# Patient Record
Sex: MALE | Race: WHITE | Employment: OTHER | URBAN - METROPOLITAN AREA
[De-identification: names, ages, dates, MRNs, and addresses within clinical notes are randomized per-mention and may not be internally consistent; named-entity substitution may affect disease eponyms.]

---

## 2018-02-11 ENCOUNTER — HOSPITAL ENCOUNTER (INPATIENT)
Facility: CLINIC | Age: 83
LOS: 7 days | Discharge: NURSING, REHAB OR LONG-TERM CARE | DRG: 871 | End: 2018-02-18
Attending: EMERGENCY MEDICINE | Admitting: INTERNAL MEDICINE
Payer: MEDICARE

## 2018-02-11 ENCOUNTER — APPOINTMENT (OUTPATIENT)
Dept: GENERAL RADIOLOGY | Facility: CLINIC | Age: 83
DRG: 871 | End: 2018-02-11
Attending: EMERGENCY MEDICINE
Payer: MEDICARE

## 2018-02-11 ENCOUNTER — APPOINTMENT (OUTPATIENT)
Dept: SPEECH THERAPY | Facility: CLINIC | Age: 83
DRG: 871 | End: 2018-02-11
Attending: INTERNAL MEDICINE
Payer: MEDICARE

## 2018-02-11 ENCOUNTER — TRANSFERRED RECORDS (OUTPATIENT)
Dept: HEALTH INFORMATION MANAGEMENT | Facility: CLINIC | Age: 83
End: 2018-02-11

## 2018-02-11 DIAGNOSIS — R09.02 HYPOXIA: ICD-10-CM

## 2018-02-11 DIAGNOSIS — E87.6 HYPOKALEMIA: Primary | ICD-10-CM

## 2018-02-11 DIAGNOSIS — J18.9 COMMUNITY ACQUIRED PNEUMONIA OF LEFT LUNG, UNSPECIFIED PART OF LUNG: ICD-10-CM

## 2018-02-11 PROBLEM — A41.9 SEPSIS (H): Status: ACTIVE | Noted: 2018-02-11

## 2018-02-11 LAB
ALBUMIN SERPL-MCNC: 2.5 G/DL (ref 3.4–5)
ALBUMIN UR-MCNC: NEGATIVE MG/DL
ALP SERPL-CCNC: 58 U/L (ref 40–150)
ALT SERPL W P-5'-P-CCNC: 21 U/L (ref 0–70)
AMORPH CRY #/AREA URNS HPF: ABNORMAL /HPF
ANION GAP SERPL CALCULATED.3IONS-SCNC: 2 MMOL/L (ref 3–14)
APPEARANCE UR: ABNORMAL
AST SERPL W P-5'-P-CCNC: 23 U/L (ref 0–45)
BASOPHILS # BLD AUTO: 0 10E9/L (ref 0–0.2)
BASOPHILS NFR BLD AUTO: 0.2 %
BILIRUB SERPL-MCNC: 1.2 MG/DL (ref 0.2–1.3)
BILIRUB UR QL STRIP: NEGATIVE
BUN SERPL-MCNC: 17 MG/DL (ref 7–30)
CALCIUM SERPL-MCNC: 8.5 MG/DL (ref 8.5–10.1)
CHLORIDE SERPL-SCNC: 109 MMOL/L (ref 94–109)
CO2 BLDCOV-SCNC: 30 MMOL/L (ref 21–28)
CO2 SERPL-SCNC: 31 MMOL/L (ref 20–32)
COLOR UR AUTO: YELLOW
CREAT SERPL-MCNC: 0.59 MG/DL (ref 0.66–1.25)
DIFFERENTIAL METHOD BLD: ABNORMAL
EOSINOPHIL # BLD AUTO: 0.1 10E9/L (ref 0–0.7)
EOSINOPHIL NFR BLD AUTO: 1.4 %
ERYTHROCYTE [DISTWIDTH] IN BLOOD BY AUTOMATED COUNT: 14.2 % (ref 10–15)
FLUAV+FLUBV AG SPEC QL: NEGATIVE
FLUAV+FLUBV AG SPEC QL: NEGATIVE
GFR SERPL CREATININE-BSD FRML MDRD: >90 ML/MIN/1.7M2
GLUCOSE SERPL-MCNC: 98 MG/DL (ref 70–99)
GLUCOSE UR STRIP-MCNC: NEGATIVE MG/DL
HCT VFR BLD AUTO: 35.1 % (ref 40–53)
HGB BLD-MCNC: 11.7 G/DL (ref 13.3–17.7)
HGB UR QL STRIP: NEGATIVE
IMM GRANULOCYTES # BLD: 0 10E9/L (ref 0–0.4)
IMM GRANULOCYTES NFR BLD: 0.2 %
KETONES UR STRIP-MCNC: 20 MG/DL
LACTATE BLD-SCNC: 1.5 MMOL/L (ref 0.7–2.1)
LEUKOCYTE ESTERASE UR QL STRIP: NEGATIVE
LYMPHOCYTES # BLD AUTO: 1.7 10E9/L (ref 0.8–5.3)
LYMPHOCYTES NFR BLD AUTO: 19.9 %
MCH RBC QN AUTO: 31.7 PG (ref 26.5–33)
MCHC RBC AUTO-ENTMCNC: 33.3 G/DL (ref 31.5–36.5)
MCV RBC AUTO: 95 FL (ref 78–100)
MONOCYTES # BLD AUTO: 0.6 10E9/L (ref 0–1.3)
MONOCYTES NFR BLD AUTO: 6.8 %
MUCOUS THREADS #/AREA URNS LPF: PRESENT /LPF
NEUTROPHILS # BLD AUTO: 5.9 10E9/L (ref 1.6–8.3)
NEUTROPHILS NFR BLD AUTO: 71.5 %
NITRATE UR QL: NEGATIVE
NRBC # BLD AUTO: 0 10*3/UL
NRBC BLD AUTO-RTO: 0 /100
PCO2 BLDV: 44 MM HG (ref 40–50)
PH BLDV: 7.43 PH (ref 7.32–7.43)
PH UR STRIP: 7 PH (ref 5–7)
PLATELET # BLD AUTO: 140 10E9/L (ref 150–450)
PO2 BLDV: 23 MM HG (ref 25–47)
POTASSIUM SERPL-SCNC: 3.6 MMOL/L (ref 3.4–5.3)
PROCALCITONIN SERPL-MCNC: 0.24 NG/ML
PROT SERPL-MCNC: 6 G/DL (ref 6.8–8.8)
RBC # BLD AUTO: 3.69 10E12/L (ref 4.4–5.9)
RBC #/AREA URNS AUTO: 1 /HPF (ref 0–2)
SAO2 % BLDV FROM PO2: 43 %
SODIUM SERPL-SCNC: 142 MMOL/L (ref 133–144)
SOURCE: ABNORMAL
SP GR UR STRIP: 1.02 (ref 1–1.03)
SPECIMEN SOURCE: NORMAL
UROBILINOGEN UR STRIP-MCNC: 4 MG/DL (ref 0–2)
WBC # BLD AUTO: 8.3 10E9/L (ref 4–11)
WBC #/AREA URNS AUTO: 4 /HPF (ref 0–2)

## 2018-02-11 PROCEDURE — 83605 ASSAY OF LACTIC ACID: CPT

## 2018-02-11 PROCEDURE — 25000128 H RX IP 250 OP 636: Performed by: EMERGENCY MEDICINE

## 2018-02-11 PROCEDURE — 81001 URINALYSIS AUTO W/SCOPE: CPT | Performed by: EMERGENCY MEDICINE

## 2018-02-11 PROCEDURE — 99207 ZZC DOWN CODE DUE TO INITIAL EXAM: CPT | Performed by: INTERNAL MEDICINE

## 2018-02-11 PROCEDURE — 87804 INFLUENZA ASSAY W/OPTIC: CPT | Performed by: EMERGENCY MEDICINE

## 2018-02-11 PROCEDURE — 25000132 ZZH RX MED GY IP 250 OP 250 PS 637: Mod: GY | Performed by: INTERNAL MEDICINE

## 2018-02-11 PROCEDURE — 96361 HYDRATE IV INFUSION ADD-ON: CPT

## 2018-02-11 PROCEDURE — 99221 1ST HOSP IP/OBS SF/LOW 40: CPT | Mod: AI | Performed by: INTERNAL MEDICINE

## 2018-02-11 PROCEDURE — 87040 BLOOD CULTURE FOR BACTERIA: CPT | Performed by: EMERGENCY MEDICINE

## 2018-02-11 PROCEDURE — 80053 COMPREHEN METABOLIC PANEL: CPT | Performed by: EMERGENCY MEDICINE

## 2018-02-11 PROCEDURE — 82803 BLOOD GASES ANY COMBINATION: CPT

## 2018-02-11 PROCEDURE — A9270 NON-COVERED ITEM OR SERVICE: HCPCS | Mod: GY | Performed by: INTERNAL MEDICINE

## 2018-02-11 PROCEDURE — 36415 COLL VENOUS BLD VENIPUNCTURE: CPT | Performed by: INTERNAL MEDICINE

## 2018-02-11 PROCEDURE — 40000225 ZZH STATISTIC SLP WARD VISIT: Performed by: SPEECH-LANGUAGE PATHOLOGIST

## 2018-02-11 PROCEDURE — 84145 PROCALCITONIN (PCT): CPT | Performed by: INTERNAL MEDICINE

## 2018-02-11 PROCEDURE — 96367 TX/PROPH/DG ADDL SEQ IV INF: CPT

## 2018-02-11 PROCEDURE — 25000128 H RX IP 250 OP 636: Performed by: INTERNAL MEDICINE

## 2018-02-11 PROCEDURE — 96365 THER/PROPH/DIAG IV INF INIT: CPT

## 2018-02-11 PROCEDURE — 12000007 ZZH R&B INTERMEDIATE

## 2018-02-11 PROCEDURE — 85025 COMPLETE CBC W/AUTO DIFF WBC: CPT | Performed by: EMERGENCY MEDICINE

## 2018-02-11 PROCEDURE — 92610 EVALUATE SWALLOWING FUNCTION: CPT | Mod: GN | Performed by: SPEECH-LANGUAGE PATHOLOGIST

## 2018-02-11 PROCEDURE — 99285 EMERGENCY DEPT VISIT HI MDM: CPT | Mod: 25

## 2018-02-11 PROCEDURE — 36415 COLL VENOUS BLD VENIPUNCTURE: CPT | Performed by: EMERGENCY MEDICINE

## 2018-02-11 PROCEDURE — 71045 X-RAY EXAM CHEST 1 VIEW: CPT

## 2018-02-11 RX ORDER — DIVALPROEX SODIUM 250 MG/1
250 TABLET, DELAYED RELEASE ORAL 2 TIMES DAILY
Status: DISCONTINUED | OUTPATIENT
Start: 2018-02-11 | End: 2018-02-12

## 2018-02-11 RX ORDER — LANOLIN ALCOHOL/MO/W.PET/CERES
3 CREAM (GRAM) TOPICAL AT BEDTIME
Status: DISCONTINUED | OUTPATIENT
Start: 2018-02-11 | End: 2018-02-12

## 2018-02-11 RX ORDER — LOPERAMIDE HCL 2 MG
2 CAPSULE ORAL 4 TIMES DAILY PRN
Status: DISCONTINUED | OUTPATIENT
Start: 2018-02-11 | End: 2018-02-18 | Stop reason: HOSPADM

## 2018-02-11 RX ORDER — LORAZEPAM 0.5 MG/1
0.5 TABLET ORAL EVERY 6 HOURS PRN
Status: DISCONTINUED | OUTPATIENT
Start: 2018-02-11 | End: 2018-02-12

## 2018-02-11 RX ORDER — IPRATROPIUM BROMIDE AND ALBUTEROL SULFATE 2.5; .5 MG/3ML; MG/3ML
3 SOLUTION RESPIRATORY (INHALATION) EVERY 4 HOURS PRN
Status: DISCONTINUED | OUTPATIENT
Start: 2018-02-11 | End: 2018-02-18 | Stop reason: HOSPADM

## 2018-02-11 RX ORDER — ONDANSETRON 4 MG/1
4 TABLET, ORALLY DISINTEGRATING ORAL EVERY 6 HOURS PRN
Status: DISCONTINUED | OUTPATIENT
Start: 2018-02-11 | End: 2018-02-18 | Stop reason: HOSPADM

## 2018-02-11 RX ORDER — SODIUM CHLORIDE 9 MG/ML
INJECTION, SOLUTION INTRAVENOUS CONTINUOUS
Status: DISCONTINUED | OUTPATIENT
Start: 2018-02-11 | End: 2018-02-13

## 2018-02-11 RX ORDER — CEFTRIAXONE SODIUM 1 G/50ML
1 INJECTION, SOLUTION INTRAVENOUS ONCE
Status: COMPLETED | OUTPATIENT
Start: 2018-02-11 | End: 2018-02-11

## 2018-02-11 RX ORDER — ONDANSETRON 2 MG/ML
4 INJECTION INTRAMUSCULAR; INTRAVENOUS EVERY 6 HOURS PRN
Status: DISCONTINUED | OUTPATIENT
Start: 2018-02-11 | End: 2018-02-18 | Stop reason: HOSPADM

## 2018-02-11 RX ORDER — TRAZODONE HYDROCHLORIDE 100 MG/1
200 TABLET ORAL AT BEDTIME
Status: DISCONTINUED | OUTPATIENT
Start: 2018-02-11 | End: 2018-02-12

## 2018-02-11 RX ORDER — CEFTRIAXONE SODIUM 1 G/50ML
1 INJECTION, SOLUTION INTRAVENOUS EVERY 24 HOURS
Status: DISCONTINUED | OUTPATIENT
Start: 2018-02-12 | End: 2018-02-12

## 2018-02-11 RX ORDER — POLYETHYLENE GLYCOL 3350 17 G/17G
1 POWDER, FOR SOLUTION ORAL DAILY
COMMUNITY

## 2018-02-11 RX ORDER — ACETAMINOPHEN 325 MG/1
650 TABLET ORAL EVERY 4 HOURS PRN
Status: DISCONTINUED | OUTPATIENT
Start: 2018-02-11 | End: 2018-02-18 | Stop reason: HOSPADM

## 2018-02-11 RX ORDER — ACETAMINOPHEN 325 MG/1
650 TABLET ORAL 2 TIMES DAILY
Status: DISCONTINUED | OUTPATIENT
Start: 2018-02-11 | End: 2018-02-12

## 2018-02-11 RX ORDER — DIVALPROEX SODIUM 125 MG/1
125 TABLET, DELAYED RELEASE ORAL AT BEDTIME
Status: DISCONTINUED | OUTPATIENT
Start: 2018-02-11 | End: 2018-02-12

## 2018-02-11 RX ORDER — TRAZODONE HYDROCHLORIDE 50 MG/1
50 TABLET, FILM COATED ORAL
Status: DISCONTINUED | OUTPATIENT
Start: 2018-02-11 | End: 2018-02-18 | Stop reason: HOSPADM

## 2018-02-11 RX ORDER — NALOXONE HYDROCHLORIDE 0.4 MG/ML
.1-.4 INJECTION, SOLUTION INTRAMUSCULAR; INTRAVENOUS; SUBCUTANEOUS
Status: DISCONTINUED | OUTPATIENT
Start: 2018-02-11 | End: 2018-02-18 | Stop reason: HOSPADM

## 2018-02-11 RX ORDER — LOPERAMIDE HCL 2 MG
2 CAPSULE ORAL 4 TIMES DAILY PRN
Status: ON HOLD | COMMUNITY
End: 2019-02-03

## 2018-02-11 RX ORDER — HEPARIN SODIUM 5000 [USP'U]/.5ML
5000 INJECTION, SOLUTION INTRAVENOUS; SUBCUTANEOUS EVERY 12 HOURS
Status: DISCONTINUED | OUTPATIENT
Start: 2018-02-11 | End: 2018-02-18 | Stop reason: HOSPADM

## 2018-02-11 RX ORDER — QUETIAPINE FUMARATE 50 MG/1
50 TABLET, FILM COATED ORAL 2 TIMES DAILY
Status: DISCONTINUED | OUTPATIENT
Start: 2018-02-11 | End: 2018-02-12

## 2018-02-11 RX ADMIN — SODIUM CHLORIDE 1000 ML: 9 INJECTION, SOLUTION INTRAVENOUS at 03:11

## 2018-02-11 RX ADMIN — ACETAMINOPHEN 650 MG: 325 TABLET ORAL at 14:30

## 2018-02-11 RX ADMIN — CEFTRIAXONE SODIUM 1 G: 1 INJECTION, SOLUTION INTRAVENOUS at 03:55

## 2018-02-11 RX ADMIN — DIVALPROEX SODIUM 250 MG: 250 TABLET, DELAYED RELEASE ORAL at 23:04

## 2018-02-11 RX ADMIN — MELATONIN TAB 3 MG 3 MG: 3 TAB at 23:05

## 2018-02-11 RX ADMIN — QUETIAPINE FUMARATE 50 MG: 50 TABLET, FILM COATED ORAL at 23:04

## 2018-02-11 RX ADMIN — HEPARIN SODIUM 5000 UNITS: 5000 INJECTION, SOLUTION INTRAVENOUS; SUBCUTANEOUS at 08:07

## 2018-02-11 RX ADMIN — LORAZEPAM 0.5 MG: 0.5 TABLET ORAL at 11:30

## 2018-02-11 RX ADMIN — SODIUM CHLORIDE: 9 INJECTION, SOLUTION INTRAVENOUS at 06:11

## 2018-02-11 RX ADMIN — QUETIAPINE FUMARATE 50 MG: 50 TABLET, FILM COATED ORAL at 10:16

## 2018-02-11 RX ADMIN — DIVALPROEX SODIUM 250 MG: 250 TABLET, DELAYED RELEASE ORAL at 10:16

## 2018-02-11 RX ADMIN — TRAZODONE HYDROCHLORIDE 200 MG: 100 TABLET ORAL at 23:03

## 2018-02-11 RX ADMIN — HEPARIN SODIUM 5000 UNITS: 5000 INJECTION, SOLUTION INTRAVENOUS; SUBCUTANEOUS at 21:08

## 2018-02-11 RX ADMIN — ACETAMINOPHEN 650 MG: 325 TABLET, FILM COATED ORAL at 23:03

## 2018-02-11 RX ADMIN — AZITHROMYCIN MONOHYDRATE 500 MG: 500 INJECTION, POWDER, LYOPHILIZED, FOR SOLUTION INTRAVENOUS at 04:46

## 2018-02-11 RX ADMIN — SODIUM CHLORIDE: 9 INJECTION, SOLUTION INTRAVENOUS at 16:00

## 2018-02-11 ASSESSMENT — ACTIVITIES OF DAILY LIVING (ADL)
ADLS_ACUITY_SCORE: 31
ADLS_ACUITY_SCORE: 22

## 2018-02-11 ASSESSMENT — ENCOUNTER SYMPTOMS
FATIGUE: 1
FEVER: 1
CHILLS: 1

## 2018-02-11 NOTE — ED NOTES
[facility] [place]  ED Nurse Handoff Report    Capo Jacques is a 87 year old male   ED Chief complaint: Fever  . ED Diagnosis:   Final diagnoses:   Hypoxia     Allergies:   Allergies   Allergen Reactions     Metoprolol Succinate Other (See Comments)     Bad dreams,restlessness.     Septra [Sulfa Drugs] GI Disturbance     Doxycycline GI Disturbance     GI upset       Code Status: DNR / DNI  Activity level - Baseline/Home:  Total Care. Activity Level - Current:   Total Care. Lift room needed: Yes. Bariatric: No   Needed: No   Isolation: No. Infection: Not Applicable.     Vital Signs:   Vitals:    02/11/18 0218 02/11/18 0245 02/11/18 0315   BP: (!) 124/106 150/69 105/88   Resp: 25 27 18   Temp: 100.7  F (38.2  C)     TempSrc: Rectal     SpO2: 96%  94%       Cardiac Rhythm:  ,      Pain level:    Patient confused: Yes. Patient Falls Risk: Yes.   Elimination Status: Has voided   Patient Report - Initial Complaint: Increased confusion. Focused Assessment: pt unable to make needs known. Has fever and increased confusion per living facility   Tests Performed: labs, imaging. Abnormal Results:   Labs Ordered and Resulted from Time of ED Arrival Up to the Time of Departure from the ED   CBC WITH PLATELETS DIFFERENTIAL - Abnormal; Notable for the following:        Result Value    RBC Count 3.69 (*)     Hemoglobin 11.7 (*)     Hematocrit 35.1 (*)     Platelet Count 140 (*)     All other components within normal limits   COMPREHENSIVE METABOLIC PANEL - Abnormal; Notable for the following:     Anion Gap 2 (*)     Creatinine 0.59 (*)     Albumin 2.5 (*)     Protein Total 6.0 (*)     All other components within normal limits   ROUTINE UA WITH MICROSCOPIC - Abnormal; Notable for the following:     Ketones Urine 20 (*)     Urobilinogen mg/dL 4.0 (*)     WBC Urine 4 (*)     Mucous Urine Present (*)     Amorphous Crystals Many (*)     All other components within normal limits   ISTAT  GASES LACTATE LEO POCT -  Abnormal; Notable for the following:     PO2 Venous 23 (*)     Bicarbonate Venous 30 (*)     All other components within normal limits   ISTAT CG4 GASES LACTATE LEO NURSING POCT   BLOOD CULTURE   BLOOD CULTURE   INFLUENZA A/B ANTIGEN     Chest  XR, 1 view PORTABLE    (Results Pending)   .   Treatments provided: IVF  Family Comments: none present  OBS brochure/video discussed/provided to patient:  No  ED Medications:   Medications   0.9% sodium chloride BOLUS (1,000 mLs Intravenous New Bag 2/11/18 0311)     Drips infusing:  No  For the majority of the shift, the patient's behavior Yellow. Interventions performed were redirection, visible by staff. Pt is very restless, pulls at things.     Severe Sepsis OR Septic Shock Diagnosis Present: No      ED Nurse Name/Phone Number: Parisa Solis,   3:32 AM  RECEIVING UNIT ED HANDOFF REVIEW    Above ED Nurse Handoff Report was reviewed: Yes  Reviewed by: Katelyn Starkey on February 11, 2018 at 4:58 AM

## 2018-02-11 NOTE — IP AVS SNAPSHOT
[place] [id number] [facility]    [address]    [facility] [address]    Phone:  [phone]    Fax:  [phone]                                       After Visit Summary   2/11/2018    Capo Jacques    MRN: [id number]           After Visit Summary Signature Page     I have received my discharge instructions, and my questions have been answered. I have discussed any challenges I see with this plan with the nurse or doctor.    ..........................................................................................................................................  Patient/Patient Representative Signature      ..........................................................................................................................................  Patient Representative Print Name and Relationship to Patient    ..................................................               ................................................  Date                                            Time    ..........................................................................................................................................  Reviewed by Signature/Title    ...................................................              ..............................................  Date                                                            Time

## 2018-02-11 NOTE — PROGRESS NOTES
Patient was seen and examined by me this morning.  History and physical from Eileen Graham MD was reviewed and  plan and will follow his treatment plan will be continued.  Speech pathology is working with patient, patient is up in chair and appears to be a little somnolent but alert.  He has with cough.  Plan is to continue antibiotic, monitor closely and await further recommendation from speech pathology.  Home medications reviewed and reconciled.

## 2018-02-11 NOTE — PROGRESS NOTES
02/11/18 1000   General Information   Onset Date 02/11/18   Start of Care Date 02/11/18   Referring Physician Dr. Graham   Patient Profile Review/OT: Additional Occupational Profile Info See Profile for full history and prior level of function   Patient/Family Goals Statement None stated, pt minimally verbal   Swallowing Evaluation Bedside swallow evaluation   Behavorial Observations Other (Comment)  (Minimally verbal, limited ability to follow directions)   Mode of current nutrition NPO   Respiratory Status O2 Supply   Type of O2 supply Nasal cannula   Comments Pt admitted 2/11/18 with PNA. Patient has dementia and lives in geriatric group home where he is on a modified diet with thickened liquids (per chart). Patient is minimally verbal at baseline and unable to give any history.   Clinical Swallow Evaluation   Oral Musculature unable to assess due to poor participation/comprehension   Additional Documentation Yes   Clinical Swallow Eval: Nectar Thick Liquid Texture Trial   Mode of Presentation, Nectar fed by clinician;cup;spoon   Volume of Nectar Presented 3 tsps of nectar-thick water, 5 cup sips   Oral Phase, Nectar Poor AP movement   Pharyngeal Phase, Nectar repeated swallows;impaired;no awareness of problems   Diagnostic Statement Pt biting spoon when presented and opening mouth too wide for cup one time. Variable delay in initiation of pharyngeal swallow. No s/sx of penetration aspiration.   Clinical Swallow Eval: Puree Solid Texture Trial   Mode of Presentation, Puree spoon;fed by clinician   Volume of Puree Presented Five 1/2 tsp bites   Oral Phase, Puree Poor AP movement   Pharyngeal Phase, Puree impaired;no awareness of problems;repeated swallows;reduction in laryngeal movement   Diagnostic Statement Prolonged and incoordinated oral management. Delayed initiation of pharyngeal swallow. Spontaneous double swallows. No s/sx of penetration/aspiration.   General Therapy Interventions   Planned Therapy  Interventions Dysphagia Treatment   Dysphagia treatment Modified diet education;Compensatory strategies for swallowing   Swallow Eval: Clinical Impressions   Skilled Criteria for Therapy Intervention Skilled criteria met.  Treatment indicated.   Functional Assessment Scale (FAS) 2   Treatment Diagnosis Moderate-severe dysphagia   Diet texture recommendations Dysphagia diet level 1;Nectar thick liquids   Recommended Feeding/Eating Techniques alternate between small bites and sips of food/liquid;maintain upright posture during/after eating for 30 mins;no straws;small sips/bites   Therapy Frequency 3 times/wk   Predicted Duration of Therapy Intervention (days/wks) 1 week   Anticipated Discharge Disposition extended care facility   Risks and Benefits of Treatment have been explained. Yes   Patient, family and/or staff in agreement with Plan of Care Yes   Clinical Impression Comments Patient was seen for clinical swallow evaluation per MD orders. Patient presents with moderate-severe dysphagia characterized by labial incoordination, prolonged and incoordinated oral management, delayed initiation of pharyngeal swallow, reduced hyolaryngeal excursion, and pharyngeal inefficiency. Given patient's physical impairments and confusion, he is at risk for silent aspiration, even on a modified diet. RECOMMENDATIONS: Initiate NDD-I diet with nectar-thick liquids with 1:1 supervision for feeding. Feeder will need to watch for unsafe behaviors (biting the spoon, opening mouth too widely to drink from cup), give small/bites, give one bite/sip at a time and ensure first bite/sip has been swallowed before giving the next, and alternating liquids/solids. Patient must be sitting upright and be alert for all PO. Give meds crushed in applesauce. Speech service will continue to follow during admission to ensure diet tolerance. Suspect patient is close to his baseline diet/swallow function. Anticipate resumption of modified diet at discharge  without indication for OP speech services.   Total Evaluation Time   Total Evaluation Time (Minutes) 10

## 2018-02-11 NOTE — H&P
Admitted:     02/11/2018      DATE OF ADMISSION: 02/11/2018      CHIEF COMPLAINT:  Fever.      HISTORY OF PRESENT ILLNESS: History is  mainly obtained from the ER records as the patient is pretty demented and lives in a geriatric group home. This is an 87-year-old gentleman with a history of dementia, who at baseline is minimally verbal.  He was brought in by EMS after he was noted to have a fever he had not been quite himself.  He has had no recent nausea or vomiting.  He was noted to be hypoxic on arrival with an O2 sat of 88%.  He was noted to be febrile at 101.7.  The patient is on a modified diet with nectar-thickened liquids.  I discussed his care with Dr. Del Rio over here in the ER.  He is noted to have a pneumonia on his left lung and hence I am asked to admit him for further evaluation.      PAST MEDICAL HISTORY:  Significant for hypertension, arthritis, coronary artery disease status post PCI, previous bouts of atrial fibrillation.      PAST SURGICAL HISTORY:  Significant for retinal detachment surgery, stent placement, tonsillectomy and vasectomy.      FAMILY HISTORY:  Significant for diabetes in his brother.      SOCIAL HISTORY:  Unable to be obtained.      ALLERGIES:  HE IS ALLERGIC TO METOPROLOL, SEPTRA, DOXYCYCLINE.      HOME MEDICATIONS: Awaiting reconciliation, but include:    1.  Prolixin.   2.  Diltiazem.   3.  Lorazepam p.r.n.   4.  Seroquel.      Rest of his medications are awaiting reconciliation.      REVIEW OF SYSTEMS:  Unable to be obtained secondary to underlying dementia.      PHYSICAL EXAMINATION:   VITAL SIGNS:  Temperature is 100.7, pulse is 101.  Blood pressure is 112/88, his O2 sat is 92%.   GENERAL:  The patient is alert, awake, oriented to self, does not answer questions appropriately.  He moves all his extremities.   HEENT:  His pupils are equal, round, reactive to light.   LUNGS:  He has rhonchi bilaterally.   HEART:  Tachycardic, S1, S2 normal.  No murmurs or gallops.    ABDOMEN:  Soft, nontender, with good bowel sounds.   EXTREMITIES:  There is no edema.      LABORATORY:  Labwork obtained here shows the following:  CMP is grossly unremarkable other than an albumin of 2.5 and total protein of 6.0.  Lactic acid is 1.5.  A venous blood gas showed a pH of 7.43, pCO2 of 44, pO2 of 23, bicarbonate of 30.  On a CBC with diff, his white cell count is 8.3, hemoglobin 11.7, hematocrit 35.1, platelet count 140. Influenza screen obtained in the ER is negative.  Blood cultures obtained in the ER are pending.  Urinalysis shows 4 WBCs with negative leukocyte esterase.        DIAGNOSTIC DATA: Chest x-ray, 1-view shows moderate extensive hazy opacities throughout the left lung and central aspect of the right lung.  This could represent pulmonary edema or infectious or inflammatory process.      ASSESSMENT AND PLAN:   1.  Sepsis.  Likely due to community-acquired pneumonia, cannot rule out aspiration pneumonia.  We will admit him as an inpatient.  He has been initiated on Rocephin and azithromycin, which I will continue.  We will keep him n.p.o. We will have speech and swallow evaluate him.   2.  Dementia. He has a history of dementia with Lewy bodies.  He is at risk for delirium.  We will monitor.  May require a sitter.      CODE STATUS: Per his POLST form is DNR/DNI.        DISPOSITION: Admitted as an inpatient.         JONAS CAPPS MD             D: 2018   T: 2018   MT:       Name:     KATRIN FORTE   MRN:      [id number]        Account:      [id number]   :      1930        Admitted:     2018                   Document: [id number]

## 2018-02-11 NOTE — PHARMACY-ADMISSION MEDICATION HISTORY
Admission medication history interview status for this patient is complete. See [facility] admission navigator for allergy information, prior to admission medications and immunization status.     Medication history interview source(s):Patient's nurse  Medication history resources (including written lists, pill bottles, clinic record):list from [facility] and [facility]  Primary pharmacy:     Changes made to PTA medication list:  Added: trazodone prn  Deleted: None  Changed: depakote, seroquel    Actions taken by pharmacist (provider contacted, etc):spoke to RN at [facility]     Additional medication history information:None    Medication reconciliation/reorder completed by provider prior to medication history? No    Do you take OTC medications (eg tylenol, ibuprofen, fish oil, eye/ear drops, etc)? Y(Y/N)    For patients on insulin therapy: N (Y/N)      Prior to Admission medications    Medication Sig Last Dose Taking? Auth Provider   ACETAMINOPHEN PO Take 650 mg by mouth 2 times daily  Yes Reported, Patient   ACETAMINOPHEN PO Take 650 mg by mouth every 4 hours as needed for pain  Yes Reported, Patient   Divalproex Sodium (DEPAKOTE PO) Take 250 mg by mouth 2 times daily  2/10/2018 at Unknown time Yes Reported, Patient   Divalproex Sodium (DEPAKOTE PO) Take 125 mg by mouth At Bedtime Sleep, leg pain 2/10/2018 at Unknown time Yes Reported, Patient   loperamide (IMODIUM) 2 MG capsule Take 2 mg by mouth 4 times daily as needed for diarrhea  Yes Reported, Patient   LORAZEPAM PO Take 0.5 mg by mouth every 6 hours as needed (behavior)   Yes Reported, Patient   MELATONIN PO Take 3 mg by mouth At Bedtime  2/10/2018 at hs Yes Reported, Patient   magnesium hydroxide (MOM) 2400 MG/10ML SUSP Take 5 mLs by mouth daily as needed for constipation  Yes Reported, Patient   polyethylene glycol (MIRALAX/GLYCOLAX) Packet Take 1 packet by mouth daily 2/10/2018 at Unknown time Yes Reported, Patient   QUETIAPINE FUMARATE PO Take 50 mg by mouth 2 times daily   2/10/2018 at Unknown time Yes Reported, Patient   TRAZODONE HCL PO Take 200 mg by mouth At Bedtime  2/10/2018 at Unknown time Yes Reported, Patient   hypromellose (ARTIFICIAL TEARS) 0.5 % SOLN ophthalmic solution 2 drops 4 times daily as needed for dry eyes  Yes Unknown, Entered By History   TRAZODONE HCL PO Take 50 mg by mouth nightly as needed for sleep In addition to scheduled trazodone  Yes Unknown, Entered By History

## 2018-02-11 NOTE — IP AVS SNAPSHOT
` `     [place] [address] MEDICAL SURGICAL: [phone]                 INTERAGENCY TRANSFER FORM - NOTES (H&P, Discharge Summary, Consults, Procedures, Therapies)   2018                    Hospital Admission Date: 2018  KATRIN FORTE   : 1930  Sex: Male        Patient PCP Information     Provider PCP Type    Bert Robles MD ENT    Lexa Peterson MD General    Manuel Adkins MD, MD Orthopaedics    Naveen Cifuentes MD Urology    Ruthy Du DPM, DPHATTIE Podiatry      History & Physicals     No notes of this type exist for this encounter.      Discharge Summaries     No notes of this type exist for this encounter.      Consult Notes     No notes of this type exist for this encounter.         Progress Notes - Physician (Notes from 02/15/18 through 18)      Progress Notes by Lily Allen at 2018 11:57 AM     Author:  Lily Allen Service:  (none) Author Type:      Filed:  2018 11:59 AM Date of Service:  2018 11:57 AM Creation Time:  2018 11:57 AM    Status:  Signed :  Lily Allen ()         Spoke with patient wife regarding transport cost.  Wife agreed to cost.   Scheduled transport through [facility] at 1230.   Spoke with Rebecca at [facility] services informed her patient will  at 1230. Rebecca requested DC orders.   Faxed DC orders to Rebecca.[FM1.1]      Revision History        User Key Date/Time User Provider Type Action    > FM1.1 2018 11:59 AM Lily Allen  Sign            Progress Notes by Siddhartha Samayoa MD at 2018  1:15 PM     Author:  Siddhartha Samayoa MD Service:  Hospitalist Author Type:  Physician    Filed:  2018  1:21 PM Date of Service:  2018  1:15 PM Creation Time:  2018  1:15 PM    Status:  Signed :  Siddhartha Samayoa MD (Physician)         [facility]  Hospitalist Progress Note  Siddhartha Samayoa MD  "02/17/2018    Reason for Stay (Diagnosis): fever, hypoxia, altered mental status         Assessment and Plan:      Summary of Stay: Capo Jacques is a 87 year old male with history of hypertension, arthritis, coronary artery disease status post PCI, history of arterial fibrillation, dementia, minimally verbal at baseline.  He presented with confusion, fever and hypoxia admitted on 2/11/2018   Problem List:   1. Sepsis likely secondary to pneumonia.  -Possible aspiration pneumonia component   -Off oxygen , no fever in the last 24 hours.  -Continue Levaquin for today, day #7  -Monitor for fever.  -No cough or shortness of breath    2. Severe dementia/ lewy body/some behavioral issues  -Fall precaution  -Patient is on Depakote, restart at home dose.    3. Metabolic encephalopathy-resolving.    4.  Hypokalemia   -Change IV  fluids to NS with potassium chloride.  -Stop LR.    5.  Dysphagia.  Evaluated by speech language pathology  -Continue dysphagia diet 1 with thin liquid.  - He is at increased risk of aspiration.    Disposition-1 day, if he continued to improve.        Interval History (Subjective):      Patient seen and examined, resting comfortably, nt fully cooperative, does not give any history. Denied pain                  Physical Exam:      Last Vital Signs:  /54 (BP Location: Left arm)  Temp 98  F (36.7  C) (Axillary)  Resp 20  Ht 1.81 m (5' 11.26\")  Wt 76.1 kg (167 lb 12.8 oz)  SpO2 92%  BMI 27.08 kg/m2  Fever 100.1 max    Constitutional: Awake, does not follow command.  Does respond to questions.   Respiratory:  Coarse breath, lower lung field, no wheezing   Cardiovascular: Regular rate and rhythm, normal S1 and S2, and no murmur noted   Abdomen: Normal bowel sounds, soft, non-distended, non-tender   Skin: No rashes, no cyanosis, dry to touch   Neuro: Has frontal lobe signs such as hand grasping and rigidity all extremities  Some pill rolling hand movement and mild agitation   Fetal posturing "    Some minor attempts to get out of bed  Some verbal responses   Extremities: No edema, normal range of motion   Other(s): Did eat some pureed food    Lower fevers today   All other systems: Negative          Medications:        Current Facility-Administered Medications   Medication     potassium chloride (KLOR-CON) Packet 20 mEq     divalproex sodium delayed-release (DEPAKOTE) DR tablet 125 mg     divalproex sodium delayed-release (DEPAKOTE) DR tablet 250 mg     potassium chloride SA (K-DUR/KLOR-CON M) CR tablet 20-40 mEq     potassium chloride (KLOR-CON) Packet 20-40 mEq     potassium chloride 10 mEq in 100 mL sterile water intermittent infusion (premix)     potassium chloride 10 mEq in 100 mL intermittent infusion with 10 mg lidocaine     potassium chloride 20 mEq in 50 mL intermittent infusion     lidocaine 1 % 0.5-5 mL     sodium chloride (PF) 0.9% PF flush 5-50 mL     acetaminophen (TYLENOL) tablet 650 mg     QUEtiapine (SEROquel) tablet 50 mg     polyethylene glycol (MIRALAX/GLYCOLAX) Packet 17 g     sodium chloride (PF) 0.9% PF flush 10-20 mL     sodium chloride (PF) 0.9% PF flush 10 mL     influenza Vac Split High-Dose (FLUZONE) injection 0.5 mL     levofloxacin (LEVAQUIN) infusion 500 mg     haloperidol lactate (HALDOL) injection 2 mg     naloxone (NARCAN) injection 0.1-0.4 mg     melatonin tablet 1 mg     heparin sodium PF injection 5,000 Units     ondansetron (ZOFRAN-ODT) ODT tab 4 mg    Or     ondansetron (ZOFRAN) injection 4 mg     acetaminophen (TYLENOL) tablet 650 mg     traZODone (DESYREL) tablet 50 mg     loperamide (IMODIUM) capsule 2 mg     ipratropium - albuterol 0.5 mg/2.5 mg/3 mL (DUONEB) neb solution 3 mL            Data:          Recent Labs  Lab 02/17/18  0619 02/14/18  0600 02/12/18  0732 02/11/18  0235   WBC  --   --  9.6 8.3   HGB  --   --  10.6* 11.7*   HCT  --   --  31.3* 35.1*   MCV  --   --  94 95    196 143* 140*       Recent Labs  Lab 02/17/18  0619 02/16/18  0724  02/15/18  2334  02/14/18  0600 02/12/18  0707   NA  --  145*  --   --  145* 144   POTASSIUM 3.5 3.3*  3.3* 3.4  < > 2.8* 3.5   CHLORIDE  --  113*  --   --  109 113*   CO2  --  25  --   --  30 28   ANIONGAP  --  7  --   --  6 3   GLC  --  94  --   --  78 77   BUN  --  5*  --   --  8 16   CR  --  0.53*  --   --  0.53* 0.56*   GFRESTIMATED  --  >90  --   --  >90 >90   GFRESTBLACK  --  >90  --   --  >90 >90   PARTH  --  8.7  --   --  8.0* 8.2*   < > = values in this interval not displayed.    Recent Labs  Lab 02/16/18  0724 02/14/18  0600 02/12/18  0707 02/11/18  0235   GLC 94 78 77 98[AO1.1]          Revision History        User Key Date/Time User Provider Type Action    > AO1.1 2/17/2018  1:21 PM Siddhartha Samayoa MD Physician Sign            Progress Notes by Lily Allen at 2/17/2018 10:45 AM     Author:  Lily Allen Service:  (none) Author Type:      Filed:  2/17/2018 10:47 AM Date of Service:  2/17/2018 10:45 AM Creation Time:  2/17/2018 10:45 AM    Status:  Signed :  Lily Allen ()         Informed patient will ready for DC tomorrow and asked to check if [facility] will be able to accept patient  Called [facility] services nurse Meg at [phone] who noted she will be able to accept patient back tomorrow. Meg requested for DC order for faxed to [phone].[FM1.1]      Revision History        User Key Date/Time User Provider Type Action    > FM1.1 2/17/2018 10:47 AM Lily Allen  Sign            Progress Notes by Louisa Diaz RD, LD at 2/16/2018 11:52 AM     Author:  Louisa Diaz RD, LD Service:  Nutrition Author Type:  Registered Dietitian    Filed:  2/16/2018 12:27 PM Date of Service:  2/16/2018 11:52 AM Creation Time:  2/16/2018 11:52 AM    Status:  Signed :  Louisa Diaz RD, LD (Registered Dietitian)         CLINICAL NUTRITION SERVICES  -  ASSESSMENT NOTE    Malnutrition:[AK1.1] Non-severe[AK1.2]     REASON FOR  "ASSESSMENT  Capo Jacques is a 87 year old male seen by Registered Dietitian for LOS    NUTRITION HISTORY[AK1.1]  - Information obtained from EMR. Pt w/[AK1.2] h/o dementia, confused and minimally verbal at baseline. Resides in [facility]  - Modified textures and nectar-thick liquids at home. Typically good appetite per care facility.[AK1.1]   - NKFA[AK1.2]    CURRENT NUTRITION ORDERS  Diet Order:     Dysphagia Diet Level 1 - Pureed  Nectar Thick Liquids   Not appropriate for room service     Current Intake/Tolerance:  25-75% intakes recorde[AK1.1]d of nutritionally-adequate standard trays[AK1.2], occasional refusal of meals.[AK1.1] RN feeding patient lunch during visit (just starting, taking bites well). He ate well at breakfast, no concerns.[AK1.2]       PHYSICAL FINDINGS  Observed[AK1.1]  Muscle Wasting - At baseline due to decreased activity, see below.   Fat Wasting - see below[AK1.2]  Obtained from Chart/Interdisciplinary Team[AK1.1]  None noted[AK1.2]    ANTHROPOMETRICS  Height: 5' 11.26\"  Weight: 167 lbs 12.8 oz (76.1 kg)  Body mass index is 27.08 kg/(m^2).  Weight Status:  Overweight BMI 25-29.9  IBW: 78.9 kg  % IBW: 96%  Weight History: Weight appears to be trending down over the past year. 7# loss indicated in the past 9 months, 29# loss in the past year (14.7%).   Per \"Care Everywhere\"   5/30/2017 - 79.3 kg (174 lb 13.2 oz)  3/20/2017 - 89.6 kg (197 lb 8.5 oz)      LABS  Labs reviewed    MEDICATIONS  Medications reviewed[AK1.1]  NaCl + KCl IVF @ 75 mL/hr --> 75 ml/hr[AK1.2]    Dosing Weight[AK1.1] 76.1 kg[AK1.2]    ASSESSED NUTRITION NEEDS PER APPROVED PRACTICE GUIDELINES:  Estimated Energy Needs:[AK1.1] 3226-3445[AK1.2] kcals ([AK1.1]25-30 Kcal/Kg[AK1.2])  Justification:[AK1.1] maintenance[AK1.2]  Estimated Protein Needs:[AK1.1] [AK1.2] grams protein ([AK1.1]1.2-1.5 g pro/Kg[AK1.2])  Justification:[AK1.1] preservation of lean body mass[AK1.2]  Estimated Fluid Needs:[AK1.1] 6387-3332[AK1.2] mL " ([AK1.1]1 mL/Kcal[AK1.2])  Justification:[AK1.1] maintenance[AK1.2]    MALNUTRITION:  % Weight Loss:  Weight loss does not meet criteria for malnutrition (14.5% x1 year)   % Intake:[AK1.1]  No decreased intakes noted, though suspect pt w/ declining intakes over the past year.[AK1.2]   Subcutaneous Fat Loss:[AK1.1]  Orbital region mild depletion[AK1.2]  Muscle Loss:[AK1.1]  Temporal region mild depletion, Clavicle bone region mild depletion and Posterior calf region mild-moderate depletion - suspect mostly related to reduced mobility, sarcopenia w/ aging.[AK1.2]   Fluid Retention:[AK1.1]  None noted[AK1.2]    Malnutrition Diagnosis:[AK1.1] Non-Severe malnutrition[AK1.2]  In Context of:[AK1.1]  Chronic illness or disease[AK1.2]    NUTRITION DIAGNOSIS:[AK1.1]  Malnutrition[AK1.2] related to[AK1.1] suspected inadequate oral intakes d/t progressively decreasing appetite as evidenc[AK1.2]ed by[AK1.1] e/o fat/muscle wasting, progressive weight loss of up to 14.5% in the past year, coding for non-severe malnutrition.[AK1.2]     NUTRITION INTERVENTIONS  Recommendations / Nutrition Prescription[AK1.1]  Diet per SLP   Not appropriate for room service[AK1.2]       Implementation  Nutrition education:[AK1.1] Not appropriate at this time due to patient condition  Collaboration and Referral of Nutrition care: discussed appetite, intakes over admission w/ nursing at bedside.[AK1.2]       Nutrition Goals[AK1.1]  Pt to tolerate 50-75% adequate TID meals.[AK1.2]       MONITORING AND EVALUATION:[AK1.1]  Progress towards goals will be monitored and evaluated per protocol and Practice Guidelines    Louisa Diaz RD, LD  3rd floor/ICU: [phone]  All other floors: [phone]  Weekend/holiday: [phone]  Office: [phone][AK1.2]                 Revision History        User Key Date/Time User Provider Type Action    > AK1.2 2/16/2018 12:27 PM Louisa Diaz RD, LD Registered Dietitian Sign     AK1.1 2/16/2018 11:52 AM Emily,  "ANGELIA Jasso, LD Registered Dietitian             Progress Notes by Siddhartha Samayoa MD at 2/16/2018 11:45 AM     Author:  Siddhartha Samayoa MD Service:  Hospitalist Author Type:  Physician    Filed:  2/16/2018 11:59 AM Date of Service:  2/16/2018 11:45 AM Creation Time:  2/16/2018 11:45 AM    Status:  Signed :  Siddhartha Samayoa MD (Physician)         [facility]  Hospitalist Progress Note  Siddhartha Samayoa MD 02/16/2018    Reason for Stay (Diagnosis): fever, hypoxia, altered mental status         Assessment and Plan:      Summary of Stay: Capo Jacques is a 87 year old male with history of hypertension, arthritis, coronary artery disease status post PCI, history of arterial fibrillation, dementia, minimally verbal at baseline.  He presented with confusion, fever and hypoxia admitted on 2/11/2018   Problem List:   1. Sepsis likely secondary to pneumonia.  -Possible aspiration pneumonia component   -Off oxygen , no fever in the last 24 hours.  -Continue IV Levaquin.    2. Severe dementia/ lewy body/some behavioral issues  -Fall precaution  -Patient is on Depakote, restart at home dose.    3. Metabolic encephalopathy-resolving.    4.  Hypokalemia   -Change IV fluids to NS with potassium chloride.  -Stop LR  5.  Dysphagia.  Evaluated by speech language pathology  -Continue dysphagia diet 1 with thin liquid.  -At increased risk of aspiration    Mcfgfuqtynq-1-7 days, if he continued to improve, no fever, electrolytes are okay.        Interval History (Subjective):      Patient seen and examined, confused, not coherent, not fully cooperative, resist wearing examined.  Denied pain                  Physical Exam:      Last Vital Signs:  /72 (BP Location: Left arm)  Temp 97  F (36.1  C) (Axillary)  Resp 18  Ht 1.81 m (5' 11.26\")  Wt 76.1 kg (167 lb 12.8 oz)  SpO2 94%  BMI 27.08 kg/m2  Fever 100.1 max    Constitutional: Awake, does not follow command.   Respiratory:  " Coarse breath, lower lung field, no wheezing   Cardiovascular: Regular rate and rhythm, normal S1 and S2, and no murmur noted   Abdomen: Normal bowel sounds, soft, non-distended, non-tender   Skin: No rashes, no cyanosis, dry to touch   Neuro: Has frontal lobe signs such as hand grasping and rigidity all extremities  Some pill rolling hand movement and mild agitation   Fetal posturing    Some minor attempts to get out of bed  Some verbal responses   Extremities: No edema, normal range of motion   Other(s): Did eat some pureed food    Lower fevers today   All other systems: Negative          Medications:[AO1.1]        Current Facility-Administered Medications   Medication     NaCl 0.9 % 1,000 mL with potassium chloride 20 mEq/L infusion     potassium chloride SA (K-DUR/KLOR-CON M) CR tablet 20-40 mEq     potassium chloride (KLOR-CON) Packet 20-40 mEq     potassium chloride 10 mEq in 100 mL sterile water intermittent infusion (premix)     potassium chloride 10 mEq in 100 mL intermittent infusion with 10 mg lidocaine     potassium chloride 20 mEq in 50 mL intermittent infusion     lidocaine 1 % 0.5-5 mL     sodium chloride (PF) 0.9% PF flush 5-50 mL     acetaminophen (TYLENOL) tablet 650 mg     QUEtiapine (SEROquel) tablet 50 mg     polyethylene glycol (MIRALAX/GLYCOLAX) Packet 17 g     sodium chloride (PF) 0.9% PF flush 10-20 mL     sodium chloride (PF) 0.9% PF flush 10 mL     influenza Vac Split High-Dose (FLUZONE) injection 0.5 mL     levofloxacin (LEVAQUIN) infusion 500 mg     haloperidol lactate (HALDOL) injection 2 mg     acetaminophen (OFIRMEV) infusion 1,000 mg     naloxone (NARCAN) injection 0.1-0.4 mg     melatonin tablet 1 mg     heparin sodium PF injection 5,000 Units     ondansetron (ZOFRAN-ODT) ODT tab 4 mg    Or     ondansetron (ZOFRAN) injection 4 mg     acetaminophen (TYLENOL) tablet 650 mg     traZODone (DESYREL) tablet 50 mg     loperamide (IMODIUM) capsule 2 mg     ipratropium - albuterol 0.5 mg/2.5  mg/3 mL (DUONEB) neb solution 3 mL[AO1.2]            Data:[AO1.1]          Recent Labs  Lab 02/14/18  0600 02/12/18  0732 02/11/18  0235   WBC  --  9.6 8.3   HGB  --  10.6* 11.7*   HCT  --  31.3* 35.1*   MCV  --  94 95    143* 140*       Recent Labs  Lab 02/16/18  0724 02/15/18  2334 02/15/18  0843  02/14/18  0600 02/12/18  0707   *  --   --   --  145* 144   POTASSIUM 3.3*  3.3* 3.4 3.0*  < > 2.8* 3.5   CHLORIDE 113*  --   --   --  109 113*   CO2 25  --   --   --  30 28   ANIONGAP 7  --   --   --  6 3   GLC 94  --   --   --  78 77   BUN 5*  --   --   --  8 16   CR 0.53*  --   --   --  0.53* 0.56*   GFRESTIMATED >90  --   --   --  >90 >90   GFRESTBLACK >90  --   --   --  >90 >90   PARTH 8.7  --   --   --  8.0* 8.2*   < > = values in this interval not displayed.    Recent Labs  Lab 02/16/18  0724 02/14/18  0600 02/12/18  0707 02/11/18  0235   GLC 94 78 77 98[AO1.2]          Revision History        User Key Date/Time User Provider Type Action    > AO1.2 2/16/2018 11:59 Sidhdartha Shields MD Physician Sign     AO1.1 2/16/2018 11:45 AM Siddhartha Samayoa MD Physician             Progress Notes by Raisa Olvera MD at 2/15/2018  5:42 PM     Author:  Raisa Olvera MD Service:  Hospitalist Author Type:  Physician    Filed:  2/15/2018  5:44 PM Date of Service:  2/15/2018  5:42 PM Creation Time:  2/15/2018  5:42 PM    Status:  Signed :  Raisa Olvera MD (Physician)         [facility]  Hospitalist Progress Note[MS1.1]  Raisa Olvera MD 02/15/2018[MS1.2]    Reason for Stay (Diagnosis): fever, hypoxia, altered mental status         Assessment and Plan:      Summary of Stay: Capo Jacques is a 87 year old male admitted on 2/11/2018   Problem List:   1. Sepsis/left lung pneumonia  With hypoxia and fever  Being treated with IV levaquin    2. Severe dementia/ lewy body  Has frontal lobe involvement  With hand grasping when object placed in palm  Rigidity  "all extremities and some fetal posturing    3. Metabolic encephalopathy  With some verbal responses  Poor oral intake  Continue IV fluids with LR  Have to hold seizure meds  IV tylenol for fevers          Interval History (Subjective):      \" some verbal responses but not coherent?\"\"                  Physical Exam:      Last Vital Signs:[MS1.1]  /89 (BP Location: Left arm)  Temp 97.2  F (36.2  C) (Axillary)  Resp 18  Ht 1.81 m (5' 11.26\")  Wt 76.1 kg (167 lb 12.8 oz)  SpO2 96%  BMI 27.08 kg/m2[MS1.2]  Fever 100.1 max    Constitutional: Awake, does not follow any requests   Respiratory: Rales and decreased breath sounds left lung, no wheezing   Cardiovascular: Regular rate and rhythm, normal S1 and S2, and no murmur noted   Abdomen: Normal bowel sounds, soft, non-distended, non-tender   Skin: No rashes, no cyanosis, dry to touch   Neuro: Has frontal lobe signs such as hand grasping and rigidity all extremities  Some pill rolling hand movement and mild agitation   Fetal posturing    Some minor attempts to get out of bed  Some verbal responses   Extremities: No edema, normal range of motion   Other(s): Did eat some pureed food    Lower fevers today   All other systems: Negative          Medications:      All current medications were reviewed with changes reflected in problem list.         Data:      All new lab and imaging data was reviewed.   Labs: Na 145  K 3.4  ( was 2.8)  Creat 0.53    Wbc  9.6 hemoglobin 10.6  plts 143  Imaging: left lung infiltrate[MS1.1]     Revision History        User Key Date/Time User Provider Type Action    > MS1.2 2/15/2018  5:44 PM Raisa Olvera MD Physician Sign     MS1.1 2/15/2018  5:42 PM Raisa Olvera MD Physician                   Procedure Notes     No notes of this type exist for this encounter.      Progress Notes - Therapies (Notes from 02/15/18 through 02/18/18)     No notes of this type exist for this encounter.      "

## 2018-02-11 NOTE — IP AVS SNAPSHOT
"[place] [address] MEDICAL SURGICAL: [phone]                                              INTERAGENCY TRANSFER FORM - LAB / IMAGING / EKG / EMG RESULTS   2018                    Hospital Admission Date: 2018  KATRIN FORTE   : 1930  Sex: Male        Attending Provider: Eileen Graham MD     Allergies:  Metoprolol Succinate, Septra [Sulfa Drugs], Doxycycline    Infection:  None   Service:  GENERAL MEDI    Ht:  1.81 m (5' 11.26\")   Wt:  76.1 kg (167 lb 12.8 oz)   Admission Wt:  76.1 kg (167 lb 12.8 oz)    BMI:  23.23 kg/m 2   BSA:  1.96 m 2            Patient PCP Information     Provider PCP Type    Bert Robles MD ENT    Lexa Peterson MD General    Manuel Adkins MD, MD Orthopaedics    Naveen Cifuentes MD Urology    Ruthy Du, DPM, DPM Podiatry         Lab Results - 3 Days      Blood culture [[id number]]  Resulted: 18 0720, Result status: Final result    Ordering provider: Lexa Del Rio MD  18 Resulting lab: [facility]    Specimen Information    Type Source Collected On   Blood  18 0251   Comment:  Right Hand          Components       Value Reference Range Flag Lab   Specimen Description Blood Right Hand      Special Requests Aerobic and anaerobic bottles received   75   Culture Micro No growth   225            Blood culture [[id number]]  Resulted: 18 0720, Result status: Final result    Ordering provider: Lexa Del Rio MD  18 Resulting lab: [facility]    Specimen Information    Type Source Collected On   Blood  18 0258   Comment:  Left Hand          Components       Value Reference Range Flag Lab   Specimen Description Blood Left Hand      Special Requests Aerobic and anaerobic bottles received   75   Culture Micro No growth   225            Potassium [[id number]]  Resulted: 18 0650, Result status: Final result    Ordering provider: " Siddhartha Samayoa MD  02/17/18 0000 Resulting lab: [facility]    Specimen Information    Type Source Collected On   Blood  02/17/18 0619          Components       Value Reference Range Flag Lab   Potassium 3.5 3.4 - 5.3 mmol/L  FrRdHs            Platelet count [[id number]]  Resulted: 02/17/18 0637, Result status: Final result    Ordering provider: Raias Olvera MD  02/17/18 0015 Resulting lab: [facility]    Specimen Information    Type Source Collected On   Blood  02/17/18 0619          Components       Value Reference Range Flag Lab   Platelet Count 249 150 - 450 10e9/L  FrRdHs            Basic metabolic panel [[id number]] (Abnormal)  Resulted: 02/16/18 0908, Result status: Final result    Ordering provider: Siddhartha Samayoa MD  02/16/18 0839 Resulting lab: [facility]    Specimen Information    Type Source Collected On   Blood  02/16/18 0724          Components       Value Reference Range Flag Lab   Sodium 145 133 - 144 mmol/L H FrRdHs   Potassium 3.3 3.4 - 5.3 mmol/L L FrRdHs   Chloride 113 94 - 109 mmol/L H FrRdHs   Carbon Dioxide 25 20 - 32 mmol/L  FrRdHs   Anion Gap 7 3 - 14 mmol/L  FrRdHs   Glucose 94 70 - 99 mg/dL  FrRdHs   Urea Nitrogen 5 7 - 30 mg/dL L FrRdHs   Creatinine 0.53 0.66 - 1.25 mg/dL L FrRdHs   GFR Estimate >90 >60 mL/min/1.7m2  FrRd   Comment:  Non  GFR Calc   GFR Estimate If Black >90 >60 mL/min/1.7m2  FrRd   Comment:  African American GFR Calc   Calcium 8.7 8.5 - 10.1 mg/dL  FrRdHs            Potassium [[id number]] (Abnormal)  Resulted: 02/16/18 0849, Result status: Edited Result - FINAL    Ordering provider: Raisa Olvera MD  02/16/18 0001 Resulting lab: [facility]    Specimen Information    Type Source Collected On   Blood  02/16/18 0724          Components       Value Reference Range Flag Lab   Potassium 3.3 3.4 - 5.3 mmol/L L FrRdHs   Comment:  Canceled, Test credited  Duplicate request               Potassium [[id number]]  Resulted: 02/15/18 2357, Result status: Final result    Ordering provider: Raisa Olvera MD  02/15/18 2236 Resulting lab: [facility]    Specimen Information    Type Source Collected On   Blood  02/15/18 2334          Components       Value Reference Range Flag Lab   Potassium 3.4 3.4 - 5.3 mmol/L  [facility]            Sputum Culture Aerobic Bacterial [[id number]] (Abnormal)  Resulted: 02/15/18 1200, Result status: Final result    Ordering provider: Raisa Olvera MD  02/13/18 0915 Resulting lab: [facility]    Specimen Information    Type Source Collected On   Sputum  02/13/18 0917          Components       Value Reference Range Flag Lab   Specimen Description Sputum      Culture Micro --   225   Result:         Light growth  Normal adolfo     Culture Micro --  A 225   Result:         Moderate growth  Candida albicans / dubliniensis  Candida albicans and Candida dubliniensis are not routinely speciated  Susceptibility testing not routinely done              Potassium [[id number]] (Abnormal)  Resulted: 02/15/18 0901, Result status: Final result    Ordering provider: Raisa Olvera MD  02/15/18 0601 Resulting lab: [facility]    Specimen Information    Type Source Collected On   Blood  02/15/18 0843          Components       Value Reference Range Flag Lab   Potassium 3.0 3.4 - 5.3 mmol/L L [facility]            Testing Performed By     Lab - Abbreviation Name Director Address Valid Date Range    12 - [facility] Unknown [address] [facility] [address] 05/08/15 1057 - Present    75 - Unknown [facility] Unknown [address] [facility] [address] 01/15/15 1019 - Present    225 - Unknown [facility] Unknown [address] [facility] [address] 12/19/14 0954 - Present            Unresulted Labs (24h ago through future)    Start        Ordered    02/17/18 0615  Platelet count  (Pharmacological Prophylaxis- heparin (If CrCl less than 30 mL/min))  EVERY THREE DAYS,   Routine     Comments:  If no result is listed, this lab has not been done the past 365 days. LATEST LAB RESULT: Platelet Count (10e9/L)       Date                     Value                 02/14/2018               196              ----------    02/15/18 7536      Encounter-Level Documents:     There are no encounter-level documents.      Order-Level Documents:     There are no order-level documents.

## 2018-02-11 NOTE — IP AVS SNAPSHOT
` ` Patient Information     Patient Name Sex     Capo Jacques ([id number]) Male 1930       Room Bed    0307 0307-01      Patient Demographics     Address Phone    [address]  [place] [address] [phone] (Home)      Patient Ethnicity & Race     Ethnic Group Patient Race    American White      Emergency Contact(s)     Name Relation Home Work Mobile    Stephanie Jacques Spouse [phone]    [facility] staff Other       Terra    [phone]    Chuy    [phone]    Racquel    [phone]      Documents on File        Status Date Received Description       Documents for the Patient    Privacy Notice - [place] Received 18     Face Sheet  () 06     Insurance Card  06     Face Sheet  () 08     Face Sheet  () 09     RW Face Sheet Received () 09     External Medication Information Consent Accepted () 09     RW Face Sheet Received () 03/29/10     RW Face Sheet Received () 05/25/10     External Medication Information Consent Accepted () 09/10/10     RW Face Sheet Received () 12/01/10     RW Face Sheet Received () 11     External Medication Information Consent Accepted () 11     RW Face Sheet Received () 10/13/11     Patient ID Received 12     RW Face Sheet Received () 12     RW Privacy Notice Received 12     Consent Form Received 13 [facility]    RW Face Sheet Received () 13     External Medication Information Consent Accepted () 13     RW Face Sheet Received () 14     RW Privacy Notice Received 14     External Medication Information Consent Accepted () 14     Consent for EHR Access       [facility] Specified Other       Consent for Services/Privacy Notice - Hospital/Clinic       Care Everywhere Prospective Auth       RW Face Sheet Received (Deleted)  12/02/10        Documents for the Encounter    CMS IM for Patient Signature Received 02/12/18     CMS IM for Patient Signature Received 02/18/18 2nd IMM     Discharge Attachment   ADULT, PNEUMONIA (ENGLISH)      Admission Information     Attending Provider Admitting Provider Admission Type Admission Date/Time    Eileen Graham MD Parpia, Abdul K, MD Emergency 02/11/18  0214    Discharge Date Hospital Service Auth/Cert Status Service Area     General [facility]    Unit Room/Bed Admission Status        3 MEDICAL SURGICAL 0307/0307-01 Admission (Confirmed)       Admission     Complaint    Sepsis (H)      Hospital Account     Name Acct ID Class Status Primary Coverage    Capo Jacques [id number] Inpatient Open MEDICARE - MEDICARE            Guarantor Account (for Hospital Account #[id number])     Name Relation to Pt Service Area Active? Acct Type    Capo Jacques Self FCS Yes Personal/Family    Address Phone          [address] [place] [address] [phone](H)              Coverage Information (for Hospital Account #[id number])     F/O Payor/Plan Precert #    MEDICARE/MEDICARE     Subscriber Subscriber #    Capo Jacques [id number]    Address Phone    ATTN CLAIMS  [address]  [facility] [place] [address] [phone]

## 2018-02-11 NOTE — ED PROVIDER NOTES
History     Chief Complaint:  Fever    History limited due to patient's dementia and subsequently provided by EMS report.   HPI   Capo Jacques is a 87 year old male with history of dementia who presents to the emergency department today via EMS for evaluation of a fever. Per EMS report they were called to the patient's home facility due to the patient having altered mental status and being febrile. For EMS the patient was febrile to 100.7. En route the patient was started on 500mL of normal saline. Additionally the patient was started on supplemental oxygen and given one nebulizer as his initial O2 SATs were in the 80's. The patient is currently more altered than normal, and that at his baseline he is not very verbal. For EMS the patient was also frequently jayne his legs and fidgeting around.     Allergies:  Metoprolol Succinate  Septra [Sulfa Drugs]  Doxycycline      Medications:    nitroglycerin (NITROSTAT) 0.4 MG SL tablet  aspirin 81 MG chewable tablet  hydrochlorothiazide (HYDRODIURIL) 25 MG tablet  lisinopril (PRINIVIL,ZESTRIL) 2.5 MG tablet  clopidogrel (PLAVIX) 75 MG tablet  simvastatin (ZOCOR) 40 MG tablet  terazosin (HYTRIN) 5 MG capsule  ranitidine (ZANTAC) 150 MG tablet    Past Medical History:    Chest pain   Osteoarthritis   Hypertension     Past Surgical History:    Knee scope meniscectomy   Bilateral knee arthroplasty   T&A   Bilateral  cataract removal   Coronary artery stenting   Removal of sperm ducts   Removal of pilonidal cyst    Family History:    Cerebrovascular disease  Throat cancer     Social History:  The patient was accompanied to the ED by EMS .  Smoking Status: Former smoker  Smokeless Tobacco: Never used   Alcohol Use: Yes    Marital Status:        ROS limited secondary to patient's altered mental status.   Review of Systems   Constitutional: Positive for chills, fatigue and fever.   All other systems reviewed and are negative.    Physical Exam     Patient Vitals  for the past 24 hrs:   BP Temp Temp src Heart Rate Resp SpO2   02/11/18 0356 - 99.9  F (37.7  C) Temporal 97 14 -   02/11/18 0351 - - - - 13 95 %   02/11/18 0345 (!) 172/105 - - 101 24 90 %   02/11/18 0330 112/88 - - 96 29 92 %   02/11/18 0315 105/88 - - 100 18 94 %   02/11/18 0245 150/69 - - 101 27 -   02/11/18 0218 (!) 124/106 100.7  F (38.2  C) Rectal 96 25 96 %     Physical Exam  Constitutional: Alert, confused at baseline due to dementia per paramedics  HENT:    Nose: Nose normal.    Mouth/Throat: Oropharynx is clear, mucous membranes are moist   Eyes: EOM are normal. Pupils are equal, round, and reactive to light.   CV: regular rate and rhythm; no murmurs, rubs or gallups  Chest: Effort normal and breath sounds normal.   GI:  There is no tenderness. No distension. Normal bowel sounds  MSK: preferentially lying on his back with knees flexed, baseline per paramedics and NH staff  Neurological: Alert, confused at baseline  Skin: Skin is warm and dry.    Emergency Department Course     Imaging:  Radiology findings were communicated with the admitting MD who voiced understanding of the findings.    Portable Chest XR 1 View:  IMPRESSION: Moderately extensive hazy opacities throughout the left  lung and central aspect of the right lung. This could represent  pulmonary edema or an infectious or inflammatory process.  Report per radiology     Laboratory:  Laboratory findings were communicated with the admitting MD who voiced understanding of the findings.    Influenza A/B Antigen: Negative      ISTAT gases lactate dipti POCT: pH Venous 7.43, PCO2 Venous 44, PO2 Venous 23 (L), Bicarbonate Venous 30 (H), O2 Sat Venous 43, Lactic Acid 1.5   CBC: WBC 8.3, HGB 11.7 (L),  (L)  CMP: Creatinine 0.59 (L), Anion gap 2 (L), Albumin 2.5 (L), Protein total 6.0 (L), o/w WNL.     UA: Yellow and cloudy urine. Urine ketones 30, Urobilinogen 4.0 (H), WBC 4 (H), Mucous urine present, Amorphous crystals many, o/w WNL      Blood  cultures: Pending     Interventions:  0311 NS Bolus 1,000mL IV   0355 Rocephin 1g IV      Emergency Department Course:  Nursing notes and vitals reviewed.  0220 I performed an exam of the patient as documented above.   The patient was sent for a chest x-ray while in the emergency department, results above.    IV was inserted and blood was drawn for laboratory testing, results above.   A urine sample was obtained here in the emergency department. This was sent for laboratory testing, findings above.   0425 I spoke with Dr. Graham of the Hospitalist service regarding patient's presentation, findings, and plan of care.   I discussed the patient with the Dr. Graham who will admit the patient to a monitored bed for further evaluation and treatment.   Impression & Plan      Medical Decision Making:  This is an 87-year-old male with history of dementia who presents from a group home per EMS report with restlessness and fever.  He is found to be acutely hypoxic related to was consistent with community-acquired pneumonia.  His lactic is reassuring as are his vital signs.  He does have anemia which is new from previous, but the most recent labs we have are from 2014.  There are no other acute abnormalities noted on workup.  His oxygen saturations and work of breathing normal with slight supplemental oxygen.  Given acute hypoxia, advanced age, and possible mild confusion, he will be admitted and given empiric antibiotics.  Chest x-ray read per radiology does speculate possible pulmonary edema, however there is no clinical evidence of CHF at this time.    Diagnosis:    ICD-10-CM   1. Hypoxia R09.02   2. Community acquired pneumonia of left lung, unspecified part of lung J18.9       Disposition:  Admitted under Dr. Graham.     Scribe Disclosure:  Ezekiel MADRID, am serving as a scribe at 2:22 AM on 2/11/2018 to document services personally performed by Lexa Del Rio MD based on my observations and the provider's  statements to me.    2/11/2018   [facility] EMERGENCY DEPARTMENT       Lexa Del Rio MD  02/11/18 0653

## 2018-02-11 NOTE — IP AVS SNAPSHOT
"` `           [place] [address] MEDICAL SURGICAL: [phone]                                              INTERAGENCY TRANSFER FORM - NURSING   2018                    Hospital Admission Date: 2018  KATRIN FORTE   : 1930  Sex: Male        Attending Provider: Eileen Graham MD     Allergies:  Metoprolol Succinate, Septra [Sulfa Drugs], Doxycycline    Infection:  None   Service:  GENERAL MEDI    Ht:  1.81 m (5' 11.26\")   Wt:  76.1 kg (167 lb 12.8 oz)   Admission Wt:  76.1 kg (167 lb 12.8 oz)    BMI:  23.23 kg/m 2   BSA:  1.96 m 2            Patient PCP Information     Provider PCP Type    Bert Robles MD ENT    Lexa Peterson MD General    Manuel Adkins MD, MD Orthopaedics    Naveen Cifuentes MD Urology    Ruthy Du DPM, DPM Podiatry      Current Code Status     Date Active Code Status Order ID Comments User Context       Prior      Code Status History     Date Active Date Inactive Code Status Order ID Comments User Context    2018  9:02 AM  DNR/DNI [id number]  Siddhartha Samayoa MD Outpatient    2018  5:29 AM 2018  9:02 AM DNR/DNI [id number]  Eileen Graham MD Inpatient      Advance Directives        Scanned docmt in ACP Activity?           No scanned doc        Hospital Problems as of 2018              Priority Class Noted POA    Sepsis (H) Medium  2018 Yes      Non-Hospital Problems as of 2018              Priority Class Noted    Cataract Medium  2009    Primary localized osteoarthrosis, lower leg Medium  2009    Essential hypertension, benign Medium  2009    AK (actinic keratosis) Medium  10/1/2009    CAD (coronary artery disease) Medium  2/15/2011    Pure hypercholesterolemia Medium  2012      Immunizations     Name Date      Influenza (High Dose) 3 valent vaccine defer-02/15/18     Deferral:       Influenza (High Dose) 3 valent vaccine defer-18     Deferral:       Influenza (High Dose) 3 valent vaccine " "10/21/13     Influenza (IIV3) PF 10/16/12     Influenza (IIV3) PF 10/13/11     Influenza (IIV3) PF 10/19/10     Influenza (IIV3) PF 10/21/09     Pneumococcal 23 valent 10/01/96     TD (ADULT, 7+) 11/04/97          END      ASSESSMENT     Discharge Profile Flowsheet     EXPECTED DISCHARGE     Inspection of bony prominences  Full 02/18/18 1119    Expected Discharge Date  -- (TBD /Group HOme) 02/11/18 0915   Inspection under devices  Full 02/18/18 1119    GASTROINTESTINAL (ADULT,PEDIATRIC,OB)     Skin WDL  all 02/18/18 1119    GI WDL  ex 02/18/18 1119   Skin Temperature  warm 02/18/18 1119    Abdominal Appearance  rounded 02/18/18 0157   Skin Moisture  dry 02/18/18 1119    Last Bowel Movement  02/18/18 02/18/18 0157   Skin Elasticity  quick return to original state 02/17/18 2038    GI Signs/Symptoms  -- (no stool today this shift) 02/18/18 1119   Skin Integrity  abrasion(s);bruise(s);scab(s) 02/18/18 1119    Passing flatus  yes 02/18/18 0157   SAFETY      COMMUNICATION ASSESSMENT     Safety WDL  WDL 02/18/18 1119    Patient's communication style  spoken language (English or Bilingual) (dementia - difficulty communicating) 02/11/18 0218   Safety Factors  bed in low position;wheels locked;call light in reach;upper side rails raised x 2;ID band on 02/18/18 1119    SKIN     All Alarms  alarm(s) activated and audible 02/18/18 1119                 Assessment WDL (Within Defined Limits) Definitions           Safety WDL     Effective: 09/28/15    Row Information: <b>WDL Definition:</b> Bed in low position, wheels locked; call light in reach; upper side rails up x 2; ID band on<br> <font color=\"gray\"><i>Item=AS safety wdl>>List=AS safety wdl>>Version=F14</i></font>      Skin WDL     Effective: 09/28/15    Row Information: <b>WDL Definition:</b> Warm; dry; intact; elastic; without discoloration; pressure points without redness<br> <font color=\"gray\"><i>Item=AS skin wdl>>List=AS skin wdl>>Version=F14</i></font>      Vitals " "    Vital Signs Flowsheet     VITAL SIGNS     Pain Location  Leg 02/13/18 0205    Temp  96.8  F (36  C) 02/18/18 0014   Additional Documentation  -- (pt denies pain, awake in bed chanting ) 02/13/18 0655    Temp src  Axillary 02/18/18 0014   HEIGHT AND WEIGHT      Resp  22 02/18/18 0755   Height  1.81 m (5' 11.26\") 02/12/18 1425    Heart Rate  59 02/18/18 0014   Height Method  Actual 02/12/18 1425    Pulse/Heart Rate Source  Monitor 02/18/18 0755   Weight  76.1 kg (167 lb 12.8 oz) 02/11/18 0526    BP  140/60 02/18/18 0755   Weight Method  Bed scale 02/11/18 0526    BP Location  Left arm 02/18/18 0755   LINETTE COMA SCALE      OXYGEN THERAPY     Best Eye Response  4-->(E4) spontaneous 02/17/18 0104    SpO2  92 % 02/18/18 0117   Best Motor Response  5-->(M5) localizes pain 02/17/18 0104    O2 Device  None (Room air) 02/18/18 0014   Best Verbal Response  4-->(V4) confused 02/17/18 0104    Oxygen Delivery  2 LPM 02/15/18 0009   Linette Coma Scale Score  13 02/17/18 0104    PAIN/COMFORT     POSITIONING      Patient Currently in Pain  ТАТЬЯНА 02/18/18 0014   Body Position  turned;weight shift assistance provided 02/18/18 0618    Preferred Pain Scale  Adult Non-Verbal ([facility] Only) 02/18/18 0014   Head of Bed (HOB)  HOB at 30-45 degrees 02/17/18 2033    PAINAD Breathing  0-->normal 02/18/18 0014   Positioning/Transfer Devices  pillows;in use 02/17/18 2033    PAINAD Negative Vocalization  0-->none 02/18/18 0014   Chair  Recline and up in chair 02/18/18 1119    PAINAD Facial Expression  0-->smiling or inexpressive 02/18/18 0014   DAILY CARE      PAINAD Body Language  2-->rigid: fists clenched, knees up: pushing/pulling away, strikes out 02/18/18 0014   Activity Management  activity adjusted per tolerance 02/18/18 1119    PAINAD Consolability  0-->no need to console 02/18/18 0014   Activity Assistance Provided  other (see comments) 02/18/18 1119    PAINAD Score  2 02/18/18 0014   Assistive Device Utilized  mechanical lift 02/18/18 " 1119            Patient Lines/Drains/Airways Status    Active LINES/DRAINS/AIRWAYS     Name: Placement date: Placement time: Site: Days: Last dressing change:    Pressure Injury 02/11/18 Bilateral Heel redness 02/11/18   0525    7             Patient Lines/Drains/Airways Status    Active PICC/CVC     None            Intake/Output Detail Report     Date Intake     Output Net    Shift P.O. I.V. IV Piggyback Total Urine Total       Noc 02/16/18 2300 - 02/17/18 0659 -- -- -- -- -- -- 0    Day 02/17/18 0700 - 02/17/18 1459 360 623 -- 983 -- -- 983    Tonya 02/17/18 1500 - 02/17/18 2259 480 -- -- 480 -- -- 480    Noc 02/17/18 2300 - 02/18/18 0659 -- -- -- -- -- -- 0    Day 02/18/18 0700 - 02/18/18 1459 -- -- -- -- -- -- 0      Last Void/BM       Most Recent Value    Urine Occurrence 1 at 02/18/2018 0618    Stool Occurrence 1 at 02/17/2018 2033      Case Management/Discharge Planning     Case Management/Discharge Planning Flowsheet     LIVING ENVIRONMENT     ABUSE RISK SCREEN      Lives With  facility resident 02/11/18 1032   QUESTION TO PATIENT:  Has a member of your family or a partner(now or in the past) intimidated, hurt, manipulated, or controlled you in any way?  patient declined to answer or is unable to answer 02/11/18 0224    COPING/STRESS     QUESTION TO PATIENT: Do you feel safe going back to the place where you are living?  patient declined to answer or is unable to answer 02/11/18 0224    Major Change/Loss/Stressor  none 02/11/18 1032   OBSERVATION: Is there reason to believe there has been maltreatment of a vulnerable adult (ie. Physical/Sexual/Emotional abuse, self neglect, lack of adequate food, shelter, medical care, or financial exploitation)?  no 02/11/18 0224    EXPECTED DISCHARGE     OTHER      Expected Discharge Date  -- (TBD /Group HOme) 02/11/18 0915   Are you depressed or being treated for depression?  Yes (Answer r/t med. rec. ) 02/11/18 1032

## 2018-02-11 NOTE — ED NOTES
Patient here from group home with fever and increased confusion. Had a sleeping pill PTA. Received Duoneb and IVF per EMS

## 2018-02-11 NOTE — IP AVS SNAPSHOT
MRN:[id number]                      After Visit Summary   2/11/2018    Capo Jacques    MRN: [id number]           Thank you!     Thank you for choosing [facility] for your care. Our goal is always to provide you with excellent care. Hearing back from our patients is one way we can continue to improve our services. Please take a few minutes to complete the written survey that you may receive in the mail after you visit. If you would like to speak to someone directly about your visit please contact Patient Relations at [phone]. Thank you!          Patient Information     Date Of Birth          8/1/1930        Designated Caregiver       Most Recent Value    Caregiver    Will someone help with your care after discharge? yes    Name of designated caregiver Care facility    Phone number of caregiver Care facility    Caregiver address On file      About your hospital stay     You were admitted on:  February 11, 2018 You last received care in the:  [place] [address] [facility]    You were discharged on:  February 18, 2018        Reason for your hospital stay       Pneumonia                  Who to Call     For medical emergencies, please call 911.  For non-urgent questions about your medical care, please call your primary care provider or clinic, [phone]          Attending Provider     Provider Specialty    Matthias Saldana MD Emergency Medicine    [facility], Lexa Lomeli MD Emergency Medicine    Eileen Graham MD Internal Medicine       Primary Care Provider Office Phone # Fax #    Lexa Peterson [phone]      After Care Instructions     Activity       Your activity upon discharge: activity as tolerated            Diet       Follow this diet upon discharge: Orders Placed This Encounter      Dysphagia Diet Level 1 Pureed Nectar Thickened Liquids (pre-thickened or use instant food thickener)                  Follow-up Appointments     Follow-up and  "recommended labs and tests        Follow up with primary care provider, Lexa Peterson, within 5 days for hospital follow- up.  The following labs/tests are recommended: BMP.                  Further instructions from your care team       NUTRITION DISCHARGE INSTRUCTIONS  - Consider continue high protein dessert supplement (Magic Cup) for additional protein/calorie intakes.     Pending Results     No orders found from 2018 to 2018.            Statement of Approval     Ordered          18 1139  I have reviewed and agree with all the recommendations and orders detailed in this document.  EFFECTIVE NOW     Approved and electronically signed by:  Siddhartha Samayoa MD             Admission Information     Date & Time Provider Department Dept. Phone    2018 Eileen Graham MD [place] [address] [facility] [phone]      Your Vitals Were     Blood Pressure Temperature Respirations Height Weight Pulse Oximetry    140/60 (BP Location: Left arm) 96.8  F (36  C) (Axillary) 22 1.81 m (5' 11.26\") 76.1 kg (167 lb 12.8 oz) 92%    BMI (Body Mass Index)                   27.08 kg/m2           [employer] Information     [employer] lets you send messages to your doctor, view your test results, renew your prescriptions, schedule appointments and more. To sign up, go to [email or web address] . Click on \"Log in\" on the left side of the screen, which will take you to the Welcome page. Then click on \"Sign up Now\" on the right side of the page.     You will be asked to enter the access code listed below, as well as some personal information. Please follow the directions to create your username and password.     Your access code is: [email or web address]  Expires: 2018  4:08 AM     Your access code will  in 90 days. If you need help or a new code, please call your [facility] clinic or [phone].        Care EveryWhere ID     This is your Care EveryWhere ID. This could be used by other organizations to " access your [place] medical records  [id number]        Equal Access to Services     ROSA ISELA LEON : Hadii aad ku hadfaustogia Soruby, waaxda luqadaha, qaybta kaalmatanner pan, yazan kate. So [facility] [phone].    ATENCIÓN: Si habla español, tiene a bird disposición servicios gratuitos de asistencia lingüística. [facility] [phone].    We comply with applicable federal civil rights laws and [place] laws. We do not discriminate on the basis of race, color, national origin, age, disability, sex, sexual orientation, or gender identity.               Review of your medicines      START taking        Dose / Directions    potassium chloride 20 MEQ Packet   Commonly known as:  KLOR-CON   Used for:  Hypokalemia        Dose:  40 mEq   Take 40 mEq by mouth daily   Quantity:  15 tablet   Refills:  0         CONTINUE these medicines which have NOT CHANGED        Dose / Directions    * ACETAMINOPHEN PO        Dose:  650 mg   Take 650 mg by mouth 2 times daily   Refills:  0       * ACETAMINOPHEN PO        Dose:  650 mg   Take 650 mg by mouth every 4 hours as needed for pain   Refills:  0       * DEPAKOTE PO        Dose:  250 mg   Take 250 mg by mouth 2 times daily   Refills:  0       * DEPAKOTE PO        Dose:  125 mg   Take 125 mg by mouth At Bedtime Sleep, leg pain   Refills:  0       hypromellose 0.5 % Soln ophthalmic solution   Commonly known as:  ARTIFICIAL TEARS        Dose:  2 drop   2 drops 4 times daily as needed for dry eyes   Refills:  0       loperamide 2 MG capsule   Commonly known as:  IMODIUM        Dose:  2 mg   Take 2 mg by mouth 4 times daily as needed for diarrhea   Refills:  0       LORAZEPAM PO        Dose:  0.5 mg   Take 0.5 mg by mouth every 6 hours as needed (behavior)   Refills:  0       magnesium hydroxide 2400 MG/10ML Susp   Commonly known as:  MOM        Dose:  5 mL   Take 5 mLs by mouth daily as needed for constipation   Refills:  0       MELATONIN PO        Dose:  3  mg   Take 3 mg by mouth At Bedtime   Refills:  0       polyethylene glycol Packet   Commonly known as:  MIRALAX/GLYCOLAX        Dose:  1 packet   Take 1 packet by mouth daily   Refills:  0       QUETIAPINE FUMARATE PO        Dose:  50 mg   Take 50 mg by mouth 2 times daily   Refills:  0       * TRAZODONE HCL PO        Dose:  200 mg   Take 200 mg by mouth At Bedtime   Refills:  0       * TRAZODONE HCL PO        Dose:  50 mg   Take 50 mg by mouth nightly as needed for sleep In addition to scheduled trazodone   Refills:  0       * Notice:  This list has 6 medication(s) that are the same as other medications prescribed for you. Read the directions carefully, and ask your doctor or other care provider to review them with you.         Where to get your medicines      Some of these will need a paper prescription and others can be bought over the counter. Ask your nurse if you have questions.     Bring a paper prescription for each of these medications     potassium chloride 20 MEQ Packet                Protect others around you: Learn how to safely use, store and throw away your medicines at www.disposemymeds.org.             Medication List: This is a list of all your medications and when to take them. Check marks below indicate your daily home schedule. Keep this list as a reference.      Medications           Morning Afternoon Evening Bedtime As Needed    * ACETAMINOPHEN PO   Take 650 mg by mouth 2 times daily   Last time this was given:  650 mg on 2/18/2018  8:04 AM            8am           8pm               * ACETAMINOPHEN PO   Take 650 mg by mouth every 4 hours as needed for pain   Last time this was given:  650 mg on 2/18/2018  8:04 AM                                   * DEPAKOTE PO   Take 250 mg by mouth 2 times daily   Last time this was given:  250 mg on 2/18/2018  8:04 AM            8am       4pm                   * DEPAKOTE PO   Take 125 mg by mouth At Bedtime Sleep, leg pain   Last time this was given:  250 mg  on 2/18/2018  8:04 AM                        10pm           hypromellose 0.5 % Soln ophthalmic solution   Commonly known as:  ARTIFICIAL TEARS   2 drops 4 times daily as needed for dry eyes                                   loperamide 2 MG capsule   Commonly known as:  IMODIUM   Take 2 mg by mouth 4 times daily as needed for diarrhea                                   LORAZEPAM PO   Take 0.5 mg by mouth every 6 hours as needed (behavior)   Last time this was given:  0.5 mg on 2/12/2018  2:49 AM                                   magnesium hydroxide 2400 MG/10ML Susp   Commonly known as:  MOM   Take 5 mLs by mouth daily as needed for constipation                                   MELATONIN PO   Take 3 mg by mouth At Bedtime   Last time this was given:  1 mg on 2/17/2018  8:27 PM                        10pm           polyethylene glycol Packet   Commonly known as:  MIRALAX/GLYCOLAX   Take 1 packet by mouth daily   Last time this was given:  17 g on 2/17/2018  8:02 AM            8am                       potassium chloride 20 MEQ Packet   Commonly known as:  KLOR-CON   Take 40 mEq by mouth daily   Last time this was given:  20 mEq on 2/18/2018  8:05 AM            8am                       QUETIAPINE FUMARATE PO   Take 50 mg by mouth 2 times daily   Last time this was given:  50 mg on 2/18/2018  8:04 AM            8am           8pm               * TRAZODONE HCL PO   Take 200 mg by mouth At Bedtime   Last time this was given:  50 mg on 2/17/2018  8:28 PM                        10pm           * TRAZODONE HCL PO   Take 50 mg by mouth nightly as needed for sleep In addition to scheduled trazodone   Last time this was given:  50 mg on 2/17/2018  8:28 PM                                   * Notice:  This list has 6 medication(s) that are the same as other medications prescribed for you. Read the directions carefully, and ask your doctor or other care provider to review them with you.              More Information         Pneumonia (Adult)  Pneumonia is an infection deep within the lungs. It is in the small air sacs (alveoli). Pneumonia may be caused by a virus or bacteria. Pneumonia caused by bacteria is usually treated with an antibiotic. Severe cases may need to be treated in the hospital. Milder cases can be treated at home. Symptoms usually start to get better during the first 2 days of treatment.    Home care  Follow these guidelines when caring for yourself at home:    Rest at home for the first 2 to 3 days, or until you feel stronger. Don t let yourself get overly tired when you go back to your activities.    Stay away from cigarette smoke - yours or other people s.    You may use acetaminophen or ibuprofen to control fever or pain, unless another medicine was prescribed. If you have chronic liver or kidney disease, talk with your healthcare provider before using these medicines. Also talk with your provider if you ve had a stomach ulcer or gastrointestinal bleeding. Don t give aspirin to anyone younger than 18 years of age who is ill with a fever. It may cause severe liver damage.    Your appetite may be poor, so a light diet is fine.    Drink 6 to 8 glasses of fluids every day to make sure you are getting enough fluids. Beverages can include water, sport drinks, sodas without caffeine, juices, tea, or soup. Fluids will help loosen secretions in the lung. This will make it easier for you to cough up the phlegm (sputum). If you also have heart or kidney disease, check with your healthcare provider before you drink extra fluids.    Take antibiotic medicine prescribed until it is all gone, even if you are feeling better after a few days.  Follow-up care  Follow up with your healthcare provider in the next 2 to 3 days, or as advised. This is to be sure the medicine is helping you get better.  If you are 65 or older, you should get a pneumococcal vaccine and a yearly flu (influenza) shot. You should also get these vaccines  if you have chronic lung disease like asthma, emphysema, or COPD. Recently, a second type of pneumonia vaccine has become available for everyone over 65 years old. This is in addition to the previous vaccine. Ask your provider about this.  When to seek medical advice  Call your healthcare provider right away if any of these occur:    You don t get better within the first 48 hours of treatment    Shortness of breath gets worse    Rapid breathing (more than 25 breaths per minute)    Coughing up blood    Chest pain gets worse with breathing    Fever of 100.4 F (38 C) or higher that doesn t get better with fever medicine    Weakness, dizziness, or fainting that gets worse    Thirst or dry mouth that gets worse    Sinus pain, headache, or a stiff neck    Chest pain not caused by coughing  Date Last Reviewed: 1/1/2017 2000-2017 [employer]. [address] [place] [address], [place] [address]. All rights reserved. This information is not intended as a substitute for professional medical care. Always follow your healthcare professional's instructions.

## 2018-02-12 ENCOUNTER — APPOINTMENT (OUTPATIENT)
Dept: SPEECH THERAPY | Facility: CLINIC | Age: 83
DRG: 871 | End: 2018-02-12
Payer: MEDICARE

## 2018-02-12 LAB
ANION GAP SERPL CALCULATED.3IONS-SCNC: 3 MMOL/L (ref 3–14)
BASOPHILS # BLD AUTO: 0.1 10E9/L (ref 0–0.2)
BASOPHILS NFR BLD AUTO: 0.5 %
BUN SERPL-MCNC: 16 MG/DL (ref 7–30)
CALCIUM SERPL-MCNC: 8.2 MG/DL (ref 8.5–10.1)
CHLORIDE SERPL-SCNC: 113 MMOL/L (ref 94–109)
CO2 SERPL-SCNC: 28 MMOL/L (ref 20–32)
CREAT SERPL-MCNC: 0.56 MG/DL (ref 0.66–1.25)
DIFFERENTIAL METHOD BLD: ABNORMAL
EOSINOPHIL # BLD AUTO: 0.3 10E9/L (ref 0–0.7)
EOSINOPHIL NFR BLD AUTO: 3.2 %
ERYTHROCYTE [DISTWIDTH] IN BLOOD BY AUTOMATED COUNT: 14.2 % (ref 10–15)
GFR SERPL CREATININE-BSD FRML MDRD: >90 ML/MIN/1.7M2
GLUCOSE SERPL-MCNC: 77 MG/DL (ref 70–99)
HCT VFR BLD AUTO: 31.3 % (ref 40–53)
HGB BLD-MCNC: 10.6 G/DL (ref 13.3–17.7)
IMM GRANULOCYTES # BLD: 0.1 10E9/L (ref 0–0.4)
IMM GRANULOCYTES NFR BLD: 0.8 %
LACTATE BLD-SCNC: 0.7 MMOL/L (ref 0.7–2)
LYMPHOCYTES # BLD AUTO: 1.3 10E9/L (ref 0.8–5.3)
LYMPHOCYTES NFR BLD AUTO: 13.4 %
MCH RBC QN AUTO: 31.8 PG (ref 26.5–33)
MCHC RBC AUTO-ENTMCNC: 33.9 G/DL (ref 31.5–36.5)
MCV RBC AUTO: 94 FL (ref 78–100)
MONOCYTES # BLD AUTO: 0.8 10E9/L (ref 0–1.3)
MONOCYTES NFR BLD AUTO: 8.4 %
NEUTROPHILS # BLD AUTO: 7.1 10E9/L (ref 1.6–8.3)
NEUTROPHILS NFR BLD AUTO: 73.7 %
NRBC # BLD AUTO: 0 10*3/UL
NRBC BLD AUTO-RTO: 0 /100
PLATELET # BLD AUTO: 143 10E9/L (ref 150–450)
POTASSIUM SERPL-SCNC: 3.5 MMOL/L (ref 3.4–5.3)
RBC # BLD AUTO: 3.33 10E12/L (ref 4.4–5.9)
SODIUM SERPL-SCNC: 144 MMOL/L (ref 133–144)
WBC # BLD AUTO: 9.6 10E9/L (ref 4–11)

## 2018-02-12 PROCEDURE — 12000007 ZZH R&B INTERMEDIATE

## 2018-02-12 PROCEDURE — 25000128 H RX IP 250 OP 636: Performed by: INTERNAL MEDICINE

## 2018-02-12 PROCEDURE — 36415 COLL VENOUS BLD VENIPUNCTURE: CPT | Performed by: INTERNAL MEDICINE

## 2018-02-12 PROCEDURE — 99207 ZZC CDG-MDM COMPONENT: MEETS LOW - DOWN CODED: CPT | Performed by: INTERNAL MEDICINE

## 2018-02-12 PROCEDURE — 99232 SBSQ HOSP IP/OBS MODERATE 35: CPT | Performed by: INTERNAL MEDICINE

## 2018-02-12 PROCEDURE — A9270 NON-COVERED ITEM OR SERVICE: HCPCS | Mod: GY | Performed by: INTERNAL MEDICINE

## 2018-02-12 PROCEDURE — 83605 ASSAY OF LACTIC ACID: CPT | Performed by: INTERNAL MEDICINE

## 2018-02-12 PROCEDURE — 40000225 ZZH STATISTIC SLP WARD VISIT: Performed by: SPEECH-LANGUAGE PATHOLOGIST

## 2018-02-12 PROCEDURE — 25000132 ZZH RX MED GY IP 250 OP 250 PS 637: Mod: GY | Performed by: INTERNAL MEDICINE

## 2018-02-12 PROCEDURE — 80048 BASIC METABOLIC PNL TOTAL CA: CPT | Performed by: INTERNAL MEDICINE

## 2018-02-12 PROCEDURE — 85025 COMPLETE CBC W/AUTO DIFF WBC: CPT | Performed by: INTERNAL MEDICINE

## 2018-02-12 PROCEDURE — 92526 ORAL FUNCTION THERAPY: CPT | Mod: GN | Performed by: SPEECH-LANGUAGE PATHOLOGIST

## 2018-02-12 RX ORDER — ACETAMINOPHEN 10 MG/ML
1000 INJECTION, SOLUTION INTRAVENOUS EVERY 6 HOURS PRN
Status: DISCONTINUED | OUTPATIENT
Start: 2018-02-12 | End: 2018-02-16

## 2018-02-12 RX ORDER — HALOPERIDOL 5 MG/ML
2 INJECTION INTRAMUSCULAR EVERY 6 HOURS PRN
Status: DISCONTINUED | OUTPATIENT
Start: 2018-02-12 | End: 2018-02-18 | Stop reason: HOSPADM

## 2018-02-12 RX ORDER — LEVOFLOXACIN 5 MG/ML
500 INJECTION, SOLUTION INTRAVENOUS EVERY 24 HOURS
Status: DISCONTINUED | OUTPATIENT
Start: 2018-02-12 | End: 2018-02-18 | Stop reason: HOSPADM

## 2018-02-12 RX ADMIN — SODIUM CHLORIDE: 9 INJECTION, SOLUTION INTRAVENOUS at 20:55

## 2018-02-12 RX ADMIN — SODIUM CHLORIDE: 9 INJECTION, SOLUTION INTRAVENOUS at 01:23

## 2018-02-12 RX ADMIN — ACETAMINOPHEN 1000 MG: 10 INJECTION, SOLUTION INTRAVENOUS at 20:55

## 2018-02-12 RX ADMIN — LORAZEPAM 0.5 MG: 0.5 TABLET ORAL at 02:49

## 2018-02-12 RX ADMIN — SODIUM CHLORIDE: 9 INJECTION, SOLUTION INTRAVENOUS at 11:45

## 2018-02-12 RX ADMIN — LEVOFLOXACIN 500 MG: 5 INJECTION, SOLUTION INTRAVENOUS at 12:11

## 2018-02-12 RX ADMIN — CEFTRIAXONE SODIUM 1 G: 1 INJECTION, SOLUTION INTRAVENOUS at 05:38

## 2018-02-12 RX ADMIN — HEPARIN SODIUM 5000 UNITS: 5000 INJECTION, SOLUTION INTRAVENOUS; SUBCUTANEOUS at 20:55

## 2018-02-12 RX ADMIN — HEPARIN SODIUM 5000 UNITS: 5000 INJECTION, SOLUTION INTRAVENOUS; SUBCUTANEOUS at 09:44

## 2018-02-12 RX ADMIN — HALOPERIDOL LACTATE 2 MG: 5 INJECTION, SOLUTION INTRAMUSCULAR at 20:55

## 2018-02-12 ASSESSMENT — ACTIVITIES OF DAILY LIVING (ADL)
ADLS_ACUITY_SCORE: 33
ADLS_ACUITY_SCORE: 31
ADLS_ACUITY_SCORE: 33

## 2018-02-12 NOTE — PROGRESS NOTES
CC-call pts spouse to verify pts current address. Pt lives at a group home since October 23rd 2017.    [facility]  [address] [place] [address]  Phone # [phone]    Rula Reis RN, BSN [facility]  Care Coordinator  [phone]

## 2018-02-13 ENCOUNTER — APPOINTMENT (OUTPATIENT)
Dept: SPEECH THERAPY | Facility: CLINIC | Age: 83
DRG: 871 | End: 2018-02-13
Payer: MEDICARE

## 2018-02-13 LAB
GRAM STN SPEC: NORMAL
Lab: NORMAL
SPECIMEN SOURCE: NORMAL

## 2018-02-13 PROCEDURE — A9270 NON-COVERED ITEM OR SERVICE: HCPCS | Mod: GY | Performed by: INTERNAL MEDICINE

## 2018-02-13 PROCEDURE — 92526 ORAL FUNCTION THERAPY: CPT | Mod: GN

## 2018-02-13 PROCEDURE — 27211289 ZZ H KIT CATH IV 4FR 8 CM OR 10 CM, POWERWAND QUICK XL

## 2018-02-13 PROCEDURE — 99207 ZZC CDG-MDM COMPONENT: MEETS LOW - DOWN CODED: CPT | Performed by: INTERNAL MEDICINE

## 2018-02-13 PROCEDURE — 25000128 H RX IP 250 OP 636: Performed by: HOSPITALIST

## 2018-02-13 PROCEDURE — 87205 SMEAR GRAM STAIN: CPT | Performed by: INTERNAL MEDICINE

## 2018-02-13 PROCEDURE — 25000132 ZZH RX MED GY IP 250 OP 250 PS 637: Mod: GY | Performed by: INTERNAL MEDICINE

## 2018-02-13 PROCEDURE — 87106 FUNGI IDENTIFICATION YEAST: CPT | Performed by: INTERNAL MEDICINE

## 2018-02-13 PROCEDURE — 36569 INSJ PICC 5 YR+ W/O IMAGING: CPT

## 2018-02-13 PROCEDURE — 12000000 ZZH R&B MED SURG/OB

## 2018-02-13 PROCEDURE — 25000128 H RX IP 250 OP 636: Performed by: INTERNAL MEDICINE

## 2018-02-13 PROCEDURE — 40000225 ZZH STATISTIC SLP WARD VISIT

## 2018-02-13 PROCEDURE — 87070 CULTURE OTHR SPECIMN AEROBIC: CPT | Performed by: INTERNAL MEDICINE

## 2018-02-13 PROCEDURE — 99232 SBSQ HOSP IP/OBS MODERATE 35: CPT | Performed by: INTERNAL MEDICINE

## 2018-02-13 RX ORDER — HYDROMORPHONE HYDROCHLORIDE 1 MG/ML
0.2 INJECTION, SOLUTION INTRAMUSCULAR; INTRAVENOUS; SUBCUTANEOUS ONCE
Status: COMPLETED | OUTPATIENT
Start: 2018-02-13 | End: 2018-02-13

## 2018-02-13 RX ORDER — QUETIAPINE FUMARATE 50 MG/1
50 TABLET, FILM COATED ORAL 2 TIMES DAILY
Status: DISCONTINUED | OUTPATIENT
Start: 2018-02-13 | End: 2018-02-18 | Stop reason: HOSPADM

## 2018-02-13 RX ORDER — SODIUM CHLORIDE, SODIUM LACTATE, POTASSIUM CHLORIDE, CALCIUM CHLORIDE 600; 310; 30; 20 MG/100ML; MG/100ML; MG/100ML; MG/100ML
INJECTION, SOLUTION INTRAVENOUS CONTINUOUS
Status: DISCONTINUED | OUTPATIENT
Start: 2018-02-13 | End: 2018-02-16

## 2018-02-13 RX ORDER — LIDOCAINE 40 MG/G
CREAM TOPICAL
Status: DISCONTINUED | OUTPATIENT
Start: 2018-02-13 | End: 2018-02-13 | Stop reason: CLARIF

## 2018-02-13 RX ORDER — HEPARIN SODIUM,PORCINE 10 UNIT/ML
2-5 VIAL (ML) INTRAVENOUS
Status: DISCONTINUED | OUTPATIENT
Start: 2018-02-13 | End: 2018-02-13 | Stop reason: CLARIF

## 2018-02-13 RX ORDER — POLYETHYLENE GLYCOL 3350 17 G/17G
17 POWDER, FOR SOLUTION ORAL DAILY
Status: DISCONTINUED | OUTPATIENT
Start: 2018-02-13 | End: 2018-02-18 | Stop reason: HOSPADM

## 2018-02-13 RX ORDER — ACETAMINOPHEN 325 MG/1
650 TABLET ORAL 2 TIMES DAILY
Status: DISCONTINUED | OUTPATIENT
Start: 2018-02-13 | End: 2018-02-18 | Stop reason: HOSPADM

## 2018-02-13 RX ADMIN — ACETAMINOPHEN 650 MG: 325 TABLET, FILM COATED ORAL at 10:10

## 2018-02-13 RX ADMIN — HYDROMORPHONE HYDROCHLORIDE 0.2 MG: 1 INJECTION, SOLUTION INTRAMUSCULAR; INTRAVENOUS; SUBCUTANEOUS at 04:43

## 2018-02-13 RX ADMIN — SODIUM CHLORIDE, POTASSIUM CHLORIDE, SODIUM LACTATE AND CALCIUM CHLORIDE: 600; 310; 30; 20 INJECTION, SOLUTION INTRAVENOUS at 19:36

## 2018-02-13 RX ADMIN — HALOPERIDOL LACTATE 2 MG: 5 INJECTION, SOLUTION INTRAMUSCULAR at 13:56

## 2018-02-13 RX ADMIN — QUETIAPINE FUMARATE 50 MG: 50 TABLET, FILM COATED ORAL at 21:09

## 2018-02-13 RX ADMIN — ACETAMINOPHEN 650 MG: 325 TABLET, FILM COATED ORAL at 21:10

## 2018-02-13 RX ADMIN — QUETIAPINE FUMARATE 50 MG: 50 TABLET, FILM COATED ORAL at 10:10

## 2018-02-13 RX ADMIN — HEPARIN SODIUM 5000 UNITS: 5000 INJECTION, SOLUTION INTRAVENOUS; SUBCUTANEOUS at 10:10

## 2018-02-13 RX ADMIN — HALOPERIDOL LACTATE 2 MG: 5 INJECTION, SOLUTION INTRAMUSCULAR at 02:00

## 2018-02-13 RX ADMIN — LEVOFLOXACIN 500 MG: 5 INJECTION, SOLUTION INTRAVENOUS at 12:56

## 2018-02-13 RX ADMIN — SODIUM CHLORIDE, POTASSIUM CHLORIDE, SODIUM LACTATE AND CALCIUM CHLORIDE: 600; 310; 30; 20 INJECTION, SOLUTION INTRAVENOUS at 19:37

## 2018-02-13 RX ADMIN — ACETAMINOPHEN 1000 MG: 10 INJECTION, SOLUTION INTRAVENOUS at 03:12

## 2018-02-13 RX ADMIN — HALOPERIDOL LACTATE 2 MG: 5 INJECTION, SOLUTION INTRAMUSCULAR at 21:34

## 2018-02-13 RX ADMIN — SODIUM CHLORIDE: 9 INJECTION, SOLUTION INTRAVENOUS at 07:29

## 2018-02-13 RX ADMIN — HEPARIN SODIUM 5000 UNITS: 5000 INJECTION, SOLUTION INTRAVENOUS; SUBCUTANEOUS at 19:38

## 2018-02-13 ASSESSMENT — ACTIVITIES OF DAILY LIVING (ADL)
ADLS_ACUITY_SCORE: 31
ADLS_ACUITY_SCORE: 33
ADLS_ACUITY_SCORE: 31

## 2018-02-13 NOTE — PROGRESS NOTES
PICC Insertion Time-Out   Proceduralist prior to procedure:    Confirmed provider order for procedure    Verified appropriate supplies/equipment are available for procedure    Verified appropriate assessments have been completed     Prior to procedure the proceduralist instituted a 'Cease all activity' to confirm with a second nursing staff member the following:     Confirm patient identifiers using patient name and date of birth    Verify procedure to be performed    Verify consent was signed and witnessed    Verbalize any allergies    Verify code status     [Co-signature verification:  IV Access flowsheet > click any insertion column associated to a note > view Value Information)     Allison Curry RN

## 2018-02-13 NOTE — PROGRESS NOTES
"[facility]  Hospitalist Progress Note  Raisa Olvera MD 02/12/2018    Reason for Stay (Diagnosis): fever, hypoxia, altered mental status         Assessment and Plan:      Summary of Stay: Capo Jacques is a 87 year old male admitted on 2/11/2018   Problem List:   1. Sepsis/left lung pneumonia  With hypoxia and fever  Being treated with IV levaquin    2. Severe dementia/ lewy body  Non verbal at baseline    3. Metabolic encephalopathy  Decreased sensorium  Unable to take in liquids or food or meds  Continue IV fluids but will switch to LR with increased chloride  Have to hold seizure meds  IV tylenol for fevers          Interval History (Subjective):      \" no verbal responses\"                  Physical Exam:      Last Vital Signs:  /56  Temp 100.6  F (38.1  C) (Axillary)  Resp 16  Ht 1.81 m (5' 11.26\")  Wt 76.1 kg (167 lb 12.8 oz)  SpO2 98%  BMI 27.08 kg/m2  Fever 101.7    Constitutional: Awake, does not follow any requests   Respiratory: Rales and decreased breath sounds left lung, no wheezing   Cardiovascular: Regular rate and rhythm, normal S1 and S2, and no murmur noted   Abdomen: Normal bowel sounds, soft, non-distended, non-tender   Skin: No rashes, no cyanosis, dry to touch   Neuro: does not follow requests  Fetal posturing    Some minor attempts to get out of bed   Extremities: No edema, normal range of motion   Other(s): Unable to take meds       All other systems: Negative          Medications:      All current medications were reviewed with changes reflected in problem list.         Data:      All new lab and imaging data was reviewed.   Labs: Na 144  K 3.5  Creat 0.56    Imaging: left lung infiltrate  "

## 2018-02-14 LAB
ANION GAP SERPL CALCULATED.3IONS-SCNC: 6 MMOL/L (ref 3–14)
BUN SERPL-MCNC: 8 MG/DL (ref 7–30)
CALCIUM SERPL-MCNC: 8 MG/DL (ref 8.5–10.1)
CHLORIDE SERPL-SCNC: 109 MMOL/L (ref 94–109)
CO2 SERPL-SCNC: 30 MMOL/L (ref 20–32)
CREAT SERPL-MCNC: 0.53 MG/DL (ref 0.66–1.25)
GFR SERPL CREATININE-BSD FRML MDRD: >90 ML/MIN/1.7M2
GLUCOSE SERPL-MCNC: 78 MG/DL (ref 70–99)
PLATELET # BLD AUTO: 196 10E9/L (ref 150–450)
POTASSIUM SERPL-SCNC: 2.8 MMOL/L (ref 3.4–5.3)
POTASSIUM SERPL-SCNC: 3.4 MMOL/L (ref 3.4–5.3)
SODIUM SERPL-SCNC: 145 MMOL/L (ref 133–144)

## 2018-02-14 PROCEDURE — 12000000 ZZH R&B MED SURG/OB

## 2018-02-14 PROCEDURE — 36415 COLL VENOUS BLD VENIPUNCTURE: CPT | Performed by: INTERNAL MEDICINE

## 2018-02-14 PROCEDURE — 99232 SBSQ HOSP IP/OBS MODERATE 35: CPT | Performed by: INTERNAL MEDICINE

## 2018-02-14 PROCEDURE — A9270 NON-COVERED ITEM OR SERVICE: HCPCS | Mod: GY | Performed by: INTERNAL MEDICINE

## 2018-02-14 PROCEDURE — 25000128 H RX IP 250 OP 636: Performed by: INTERNAL MEDICINE

## 2018-02-14 PROCEDURE — 84132 ASSAY OF SERUM POTASSIUM: CPT | Performed by: INTERNAL MEDICINE

## 2018-02-14 PROCEDURE — 25000132 ZZH RX MED GY IP 250 OP 250 PS 637: Mod: GY | Performed by: INTERNAL MEDICINE

## 2018-02-14 PROCEDURE — 80048 BASIC METABOLIC PNL TOTAL CA: CPT | Performed by: INTERNAL MEDICINE

## 2018-02-14 PROCEDURE — 85049 AUTOMATED PLATELET COUNT: CPT | Performed by: INTERNAL MEDICINE

## 2018-02-14 RX ORDER — POTASSIUM CHLORIDE 7.45 MG/ML
10 INJECTION INTRAVENOUS
Status: DISCONTINUED | OUTPATIENT
Start: 2018-02-14 | End: 2018-02-18 | Stop reason: HOSPADM

## 2018-02-14 RX ORDER — POTASSIUM CL/LIDO/0.9 % NACL 10MEQ/0.1L
10 INTRAVENOUS SOLUTION, PIGGYBACK (ML) INTRAVENOUS
Status: DISCONTINUED | OUTPATIENT
Start: 2018-02-14 | End: 2018-02-18 | Stop reason: HOSPADM

## 2018-02-14 RX ORDER — POTASSIUM CHLORIDE 1500 MG/1
20-40 TABLET, EXTENDED RELEASE ORAL
Status: DISCONTINUED | OUTPATIENT
Start: 2018-02-14 | End: 2018-02-18 | Stop reason: HOSPADM

## 2018-02-14 RX ORDER — POTASSIUM CHLORIDE 29.8 MG/ML
20 INJECTION INTRAVENOUS
Status: DISCONTINUED | OUTPATIENT
Start: 2018-02-14 | End: 2018-02-18 | Stop reason: HOSPADM

## 2018-02-14 RX ORDER — POTASSIUM CHLORIDE 1.5 G/1.58G
20-40 POWDER, FOR SOLUTION ORAL
Status: DISCONTINUED | OUTPATIENT
Start: 2018-02-14 | End: 2018-02-18 | Stop reason: HOSPADM

## 2018-02-14 RX ADMIN — SODIUM CHLORIDE, POTASSIUM CHLORIDE, SODIUM LACTATE AND CALCIUM CHLORIDE: 600; 310; 30; 20 INJECTION, SOLUTION INTRAVENOUS at 16:07

## 2018-02-14 RX ADMIN — QUETIAPINE FUMARATE 50 MG: 50 TABLET, FILM COATED ORAL at 07:09

## 2018-02-14 RX ADMIN — HEPARIN SODIUM 5000 UNITS: 5000 INJECTION, SOLUTION INTRAVENOUS; SUBCUTANEOUS at 07:10

## 2018-02-14 RX ADMIN — Medication 1 MG: at 23:46

## 2018-02-14 RX ADMIN — HALOPERIDOL LACTATE 2 MG: 5 INJECTION, SOLUTION INTRAMUSCULAR at 20:04

## 2018-02-14 RX ADMIN — ACETAMINOPHEN 650 MG: 325 TABLET, FILM COATED ORAL at 21:04

## 2018-02-14 RX ADMIN — HALOPERIDOL LACTATE 2 MG: 5 INJECTION, SOLUTION INTRAMUSCULAR at 07:10

## 2018-02-14 RX ADMIN — SODIUM CHLORIDE, POTASSIUM CHLORIDE, SODIUM LACTATE AND CALCIUM CHLORIDE: 600; 310; 30; 20 INJECTION, SOLUTION INTRAVENOUS at 05:14

## 2018-02-14 RX ADMIN — POTASSIUM CHLORIDE 40 MEQ: 1.5 POWDER, FOR SOLUTION ORAL at 11:38

## 2018-02-14 RX ADMIN — ACETAMINOPHEN 650 MG: 325 TABLET, FILM COATED ORAL at 08:52

## 2018-02-14 RX ADMIN — HEPARIN SODIUM 5000 UNITS: 5000 INJECTION, SOLUTION INTRAVENOUS; SUBCUTANEOUS at 20:03

## 2018-02-14 RX ADMIN — TRAZODONE HYDROCHLORIDE 50 MG: 50 TABLET ORAL at 23:46

## 2018-02-14 RX ADMIN — SODIUM CHLORIDE, POTASSIUM CHLORIDE, SODIUM LACTATE AND CALCIUM CHLORIDE: 600; 310; 30; 20 INJECTION, SOLUTION INTRAVENOUS at 23:49

## 2018-02-14 RX ADMIN — POLYETHYLENE GLYCOL 3350 17 G: 17 POWDER, FOR SOLUTION ORAL at 08:52

## 2018-02-14 RX ADMIN — LEVOFLOXACIN 500 MG: 5 INJECTION, SOLUTION INTRAVENOUS at 11:36

## 2018-02-14 RX ADMIN — HALOPERIDOL LACTATE 2 MG: 5 INJECTION, SOLUTION INTRAMUSCULAR at 14:00

## 2018-02-14 RX ADMIN — POTASSIUM CHLORIDE 40 MEQ: 1.5 POWDER, FOR SOLUTION ORAL at 09:08

## 2018-02-14 RX ADMIN — QUETIAPINE FUMARATE 50 MG: 50 TABLET, FILM COATED ORAL at 21:04

## 2018-02-14 ASSESSMENT — ACTIVITIES OF DAILY LIVING (ADL)
ADLS_ACUITY_SCORE: 33

## 2018-02-14 NOTE — PROGRESS NOTES
"[facility]  Hospitalist Progress Note  Raisa Olvera MD 02/13/2018    Reason for Stay (Diagnosis): fever, hypoxia, altered mental status         Assessment and Plan:      Summary of Stay: Capo Jacques is a 87 year old male admitted on 2/11/2018   Problem List:   1. Sepsis/left lung pneumonia  With hypoxia and fever  Being treated with IV levaquin    2. Severe dementia/ lewy body  Has frontal lobe involvement  With hand grasping when object placed in palm  Rigidity all extremities and some fetal posturing    3. Metabolic encephalopathy  Today with improved sensorium  And some verbal responses  Could take some food and meds today  Continue IV fluids with LR  Have to hold seizure meds  IV tylenol for fevers          Interval History (Subjective):      \" what the hell are you doing?\"\"                  Physical Exam:      Last Vital Signs:  /82 (BP Location: Left arm)  Temp 97.5  F (36.4  C) (Axillary)  Resp 18  Ht 1.81 m (5' 11.26\")  Wt 76.1 kg (167 lb 12.8 oz)  SpO2 99%  BMI 27.08 kg/m2  Fever 101.7 max    Constitutional: Awake, does not follow any requests   Respiratory: Rales and decreased breath sounds left lung, no wheezing   Cardiovascular: Regular rate and rhythm, normal S1 and S2, and no murmur noted   Abdomen: Normal bowel sounds, soft, non-distended, non-tender   Skin: No rashes, no cyanosis, dry to touch   Neuro: Has frontal lobe signs such as hand grasping and rigidity all extremities  Some pill rolling hand movement and mild agitation   Fetal posturing    Some minor attempts to get out of bed  Some verbal responses   Extremities: No edema, normal range of motion   Other(s): Did eat some pureed food       All other systems: Negative          Medications:      All current medications were reviewed with changes reflected in problem list.         Data:      All new lab and imaging data was reviewed.   Labs: Na 144  K 3.5  Creat 0.56    Imaging: left lung infiltrate  "

## 2018-02-14 NOTE — PROVIDER NOTIFICATION
MD notified of pt potassium of 2.8. Notified that there is no K protocol ordered. Awaiting response.

## 2018-02-15 LAB
BACTERIA SPEC CULT: ABNORMAL
BACTERIA SPEC CULT: ABNORMAL
POTASSIUM SERPL-SCNC: 3 MMOL/L (ref 3.4–5.3)
POTASSIUM SERPL-SCNC: 3.4 MMOL/L (ref 3.4–5.3)
SPECIMEN SOURCE: ABNORMAL

## 2018-02-15 PROCEDURE — 25000132 ZZH RX MED GY IP 250 OP 250 PS 637: Mod: GY | Performed by: INTERNAL MEDICINE

## 2018-02-15 PROCEDURE — 99207 ZZC CDG-MDM COMPONENT: MEETS LOW - DOWN CODED: CPT | Performed by: INTERNAL MEDICINE

## 2018-02-15 PROCEDURE — A9270 NON-COVERED ITEM OR SERVICE: HCPCS | Mod: GY | Performed by: INTERNAL MEDICINE

## 2018-02-15 PROCEDURE — 25000128 H RX IP 250 OP 636: Performed by: INTERNAL MEDICINE

## 2018-02-15 PROCEDURE — 84132 ASSAY OF SERUM POTASSIUM: CPT | Performed by: INTERNAL MEDICINE

## 2018-02-15 PROCEDURE — 99232 SBSQ HOSP IP/OBS MODERATE 35: CPT | Performed by: INTERNAL MEDICINE

## 2018-02-15 PROCEDURE — 36415 COLL VENOUS BLD VENIPUNCTURE: CPT | Performed by: INTERNAL MEDICINE

## 2018-02-15 PROCEDURE — 12000000 ZZH R&B MED SURG/OB

## 2018-02-15 RX ADMIN — QUETIAPINE FUMARATE 50 MG: 50 TABLET, FILM COATED ORAL at 09:13

## 2018-02-15 RX ADMIN — LEVOFLOXACIN 500 MG: 5 INJECTION, SOLUTION INTRAVENOUS at 13:22

## 2018-02-15 RX ADMIN — HEPARIN SODIUM 5000 UNITS: 5000 INJECTION, SOLUTION INTRAVENOUS; SUBCUTANEOUS at 19:15

## 2018-02-15 RX ADMIN — Medication 10 MEQ: at 16:02

## 2018-02-15 RX ADMIN — POLYETHYLENE GLYCOL 3350 17 G: 17 POWDER, FOR SOLUTION ORAL at 09:14

## 2018-02-15 RX ADMIN — Medication 10 MEQ: at 14:21

## 2018-02-15 RX ADMIN — Medication 10 MEQ: at 17:06

## 2018-02-15 RX ADMIN — SODIUM CHLORIDE, POTASSIUM CHLORIDE, SODIUM LACTATE AND CALCIUM CHLORIDE: 600; 310; 30; 20 INJECTION, SOLUTION INTRAVENOUS at 10:43

## 2018-02-15 RX ADMIN — ACETAMINOPHEN 650 MG: 325 TABLET, FILM COATED ORAL at 20:22

## 2018-02-15 RX ADMIN — HEPARIN SODIUM 5000 UNITS: 5000 INJECTION, SOLUTION INTRAVENOUS; SUBCUTANEOUS at 09:13

## 2018-02-15 RX ADMIN — HALOPERIDOL LACTATE 2 MG: 5 INJECTION, SOLUTION INTRAMUSCULAR at 02:43

## 2018-02-15 RX ADMIN — TRAZODONE HYDROCHLORIDE 50 MG: 50 TABLET ORAL at 20:22

## 2018-02-15 RX ADMIN — ACETAMINOPHEN 650 MG: 325 TABLET, FILM COATED ORAL at 09:13

## 2018-02-15 RX ADMIN — QUETIAPINE FUMARATE 50 MG: 50 TABLET, FILM COATED ORAL at 20:22

## 2018-02-15 RX ADMIN — Medication 10 MEQ: at 10:46

## 2018-02-15 ASSESSMENT — ACTIVITIES OF DAILY LIVING (ADL)
ADLS_ACUITY_SCORE: 33

## 2018-02-15 NOTE — PROGRESS NOTES
"[facility]  Hospitalist Progress Note  Raisa Olvera MD 02/14/2018    Reason for Stay (Diagnosis): fever, hypoxia, altered mental status         Assessment and Plan:      Summary of Stay: Capo Jacques is a 87 year old male admitted on 2/11/2018   Problem List:   1. Sepsis/left lung pneumonia  With hypoxia and fever  Being treated with IV levaquin    2. Severe dementia/ lewy body  Has frontal lobe involvement  With hand grasping when object placed in palm  Rigidity all extremities and some fetal posturing    3. Metabolic encephalopathy  With some verbal responses  Poor oral intake  Continue IV fluids with LR  Have to hold seizure meds  IV tylenol for fevers          Interval History (Subjective):      \" some verbal responses but not coherent?\"\"                  Physical Exam:      Last Vital Signs:  /83 (BP Location: Left arm)  Temp 97.5  F (36.4  C) (Axillary)  Resp 18  Ht 1.81 m (5' 11.26\")  Wt 76.1 kg (167 lb 12.8 oz)  SpO2 92%  BMI 27.08 kg/m2  Fever 100.1 max    Constitutional: Awake, does not follow any requests   Respiratory: Rales and decreased breath sounds left lung, no wheezing   Cardiovascular: Regular rate and rhythm, normal S1 and S2, and no murmur noted   Abdomen: Normal bowel sounds, soft, non-distended, non-tender   Skin: No rashes, no cyanosis, dry to touch   Neuro: Has frontal lobe signs such as hand grasping and rigidity all extremities  Some pill rolling hand movement and mild agitation   Fetal posturing    Some minor attempts to get out of bed  Some verbal responses   Extremities: No edema, normal range of motion   Other(s): Did eat some pureed food    Lower fevers today   All other systems: Negative          Medications:      All current medications were reviewed with changes reflected in problem list.         Data:      All new lab and imaging data was reviewed.   Labs: Na 145  K 3.4  ( was 2.8)  Creat 0.53    Wbc  9.6 hemoglobin 10.6  plts " 143  Imaging: left lung infiltrate

## 2018-02-15 NOTE — PROGRESS NOTES
"[facility]  Hospitalist Progress Note  Raisa Olvera MD 02/15/2018    Reason for Stay (Diagnosis): fever, hypoxia, altered mental status         Assessment and Plan:      Summary of Stay: Capo Jacques is a 87 year old male admitted on 2/11/2018   Problem List:   1. Sepsis/left lung pneumonia  With hypoxia and fever  Being treated with IV levaquin    2. Severe dementia/ lewy body  Has frontal lobe involvement  With hand grasping when object placed in palm  Rigidity all extremities and some fetal posturing    3. Metabolic encephalopathy  With some verbal responses  Poor oral intake  Continue IV fluids with LR  Have to hold seizure meds  IV tylenol for fevers          Interval History (Subjective):      \" some verbal responses but not coherent?\"\"                  Physical Exam:      Last Vital Signs:  /89 (BP Location: Left arm)  Temp 97.2  F (36.2  C) (Axillary)  Resp 18  Ht 1.81 m (5' 11.26\")  Wt 76.1 kg (167 lb 12.8 oz)  SpO2 96%  BMI 27.08 kg/m2  Fever 100.1 max    Constitutional: Awake, does not follow any requests   Respiratory: Rales and decreased breath sounds left lung, no wheezing   Cardiovascular: Regular rate and rhythm, normal S1 and S2, and no murmur noted   Abdomen: Normal bowel sounds, soft, non-distended, non-tender   Skin: No rashes, no cyanosis, dry to touch   Neuro: Has frontal lobe signs such as hand grasping and rigidity all extremities  Some pill rolling hand movement and mild agitation   Fetal posturing    Some minor attempts to get out of bed  Some verbal responses   Extremities: No edema, normal range of motion   Other(s): Did eat some pureed food    Lower fevers today   All other systems: Negative          Medications:      All current medications were reviewed with changes reflected in problem list.         Data:      All new lab and imaging data was reviewed.   Labs: Na 145  K 3.4  ( was 2.8)  Creat 0.53    Wbc  9.6 hemoglobin 10.6  plts " 143  Imaging: left lung infiltrate

## 2018-02-16 ENCOUNTER — APPOINTMENT (OUTPATIENT)
Dept: SPEECH THERAPY | Facility: CLINIC | Age: 83
DRG: 871 | End: 2018-02-16
Payer: MEDICARE

## 2018-02-16 LAB
ANION GAP SERPL CALCULATED.3IONS-SCNC: 7 MMOL/L (ref 3–14)
BUN SERPL-MCNC: 5 MG/DL (ref 7–30)
CALCIUM SERPL-MCNC: 8.7 MG/DL (ref 8.5–10.1)
CHLORIDE SERPL-SCNC: 113 MMOL/L (ref 94–109)
CO2 SERPL-SCNC: 25 MMOL/L (ref 20–32)
CREAT SERPL-MCNC: 0.53 MG/DL (ref 0.66–1.25)
GFR SERPL CREATININE-BSD FRML MDRD: >90 ML/MIN/1.7M2
GLUCOSE SERPL-MCNC: 94 MG/DL (ref 70–99)
POTASSIUM SERPL-SCNC: 3.3 MMOL/L (ref 3.4–5.3)
POTASSIUM SERPL-SCNC: 3.3 MMOL/L (ref 3.4–5.3)
SODIUM SERPL-SCNC: 145 MMOL/L (ref 133–144)

## 2018-02-16 PROCEDURE — 25000132 ZZH RX MED GY IP 250 OP 250 PS 637: Mod: GY | Performed by: INTERNAL MEDICINE

## 2018-02-16 PROCEDURE — 36415 COLL VENOUS BLD VENIPUNCTURE: CPT | Performed by: INTERNAL MEDICINE

## 2018-02-16 PROCEDURE — 25000128 H RX IP 250 OP 636: Performed by: INTERNAL MEDICINE

## 2018-02-16 PROCEDURE — A9270 NON-COVERED ITEM OR SERVICE: HCPCS | Mod: GY | Performed by: INTERNAL MEDICINE

## 2018-02-16 PROCEDURE — 12000000 ZZH R&B MED SURG/OB

## 2018-02-16 PROCEDURE — 84132 ASSAY OF SERUM POTASSIUM: CPT | Performed by: INTERNAL MEDICINE

## 2018-02-16 PROCEDURE — 99232 SBSQ HOSP IP/OBS MODERATE 35: CPT | Performed by: INTERNAL MEDICINE

## 2018-02-16 PROCEDURE — 92526 ORAL FUNCTION THERAPY: CPT | Mod: GN

## 2018-02-16 PROCEDURE — 40000225 ZZH STATISTIC SLP WARD VISIT

## 2018-02-16 PROCEDURE — 80048 BASIC METABOLIC PNL TOTAL CA: CPT | Performed by: INTERNAL MEDICINE

## 2018-02-16 RX ORDER — DIVALPROEX SODIUM 250 MG/1
250 TABLET, DELAYED RELEASE ORAL 2 TIMES DAILY
Status: DISCONTINUED | OUTPATIENT
Start: 2018-02-16 | End: 2018-02-18 | Stop reason: HOSPADM

## 2018-02-16 RX ORDER — SODIUM CHLORIDE AND POTASSIUM CHLORIDE 150; 900 MG/100ML; MG/100ML
INJECTION, SOLUTION INTRAVENOUS CONTINUOUS
Status: DISCONTINUED | OUTPATIENT
Start: 2018-02-16 | End: 2018-02-16 | Stop reason: RX

## 2018-02-16 RX ORDER — DIVALPROEX SODIUM 125 MG/1
125 TABLET, DELAYED RELEASE ORAL AT BEDTIME
Status: DISCONTINUED | OUTPATIENT
Start: 2018-02-16 | End: 2018-02-18 | Stop reason: HOSPADM

## 2018-02-16 RX ADMIN — DIVALPROEX SODIUM 250 MG: 250 TABLET, DELAYED RELEASE ORAL at 17:23

## 2018-02-16 RX ADMIN — POTASSIUM CHLORIDE 20 MEQ: 1.5 POWDER, FOR SOLUTION ORAL at 11:40

## 2018-02-16 RX ADMIN — QUETIAPINE FUMARATE 50 MG: 50 TABLET, FILM COATED ORAL at 09:07

## 2018-02-16 RX ADMIN — Medication 1 MG: at 22:13

## 2018-02-16 RX ADMIN — LEVOFLOXACIN 500 MG: 5 INJECTION, SOLUTION INTRAVENOUS at 12:11

## 2018-02-16 RX ADMIN — HEPARIN SODIUM 5000 UNITS: 5000 INJECTION, SOLUTION INTRAVENOUS; SUBCUTANEOUS at 09:07

## 2018-02-16 RX ADMIN — POLYETHYLENE GLYCOL 3350 17 G: 17 POWDER, FOR SOLUTION ORAL at 09:07

## 2018-02-16 RX ADMIN — POTASSIUM CHLORIDE 40 MEQ: 1.5 POWDER, FOR SOLUTION ORAL at 09:07

## 2018-02-16 RX ADMIN — ACETAMINOPHEN 650 MG: 325 TABLET, FILM COATED ORAL at 09:07

## 2018-02-16 RX ADMIN — POTASSIUM CHLORIDE: 2 INJECTION, SOLUTION, CONCENTRATE INTRAVENOUS at 09:59

## 2018-02-16 RX ADMIN — POTASSIUM CHLORIDE: 2 INJECTION, SOLUTION, CONCENTRATE INTRAVENOUS at 21:59

## 2018-02-16 RX ADMIN — HEPARIN SODIUM 5000 UNITS: 5000 INJECTION, SOLUTION INTRAVENOUS; SUBCUTANEOUS at 22:14

## 2018-02-16 RX ADMIN — TRAZODONE HYDROCHLORIDE 50 MG: 50 TABLET ORAL at 22:13

## 2018-02-16 RX ADMIN — ACETAMINOPHEN 650 MG: 325 TABLET, FILM COATED ORAL at 22:13

## 2018-02-16 RX ADMIN — HALOPERIDOL LACTATE 2 MG: 5 INJECTION, SOLUTION INTRAMUSCULAR at 18:35

## 2018-02-16 RX ADMIN — QUETIAPINE FUMARATE 50 MG: 50 TABLET, FILM COATED ORAL at 22:13

## 2018-02-16 RX ADMIN — DIVALPROEX SODIUM 125 MG: 125 TABLET, DELAYED RELEASE ORAL at 22:13

## 2018-02-16 RX ADMIN — SODIUM CHLORIDE, POTASSIUM CHLORIDE, SODIUM LACTATE AND CALCIUM CHLORIDE: 600; 310; 30; 20 INJECTION, SOLUTION INTRAVENOUS at 02:38

## 2018-02-16 RX ADMIN — DIVALPROEX SODIUM 250 MG: 250 TABLET, DELAYED RELEASE ORAL at 12:18

## 2018-02-16 ASSESSMENT — ACTIVITIES OF DAILY LIVING (ADL)
ADLS_ACUITY_SCORE: 33

## 2018-02-16 NOTE — DISCHARGE INSTRUCTIONS
NUTRITION DISCHARGE INSTRUCTIONS  - Consider continue high protein dessert supplement (Magic Cup) for additional protein/calorie intakes.

## 2018-02-16 NOTE — PROGRESS NOTES
"CLINICAL NUTRITION SERVICES  -  ASSESSMENT NOTE    Malnutrition: Non-severe     REASON FOR ASSESSMENT  Capo Jacques is a 87 year old male seen by Registered Dietitian for LOS    NUTRITION HISTORY  - Information obtained from EMR. Pt w/ h/o dementia, confused and minimally verbal at baseline. Resides in   - Modified textures and nectar-thick liquids at home. Typically good appetite per care facility.   - NKFA    CURRENT NUTRITION ORDERS  Diet Order:     Dysphagia Diet Level 1 - Pureed  Nectar Thick Liquids   Not appropriate for room service     Current Intake/Tolerance:  25-75% intakes recorded of nutritionally-adequate standard trays, occasional refusal of meals. RN feeding patient lunch during visit (just starting, taking bites well). He ate well at breakfast, no concerns.       PHYSICAL FINDINGS  Observed  Muscle Wasting - At baseline due to decreased activity, see below.   Fat Wasting - see below  Obtained from Chart/Interdisciplinary Team  None noted    ANTHROPOMETRICS  Height: 5' 11.26\"  Weight: 167 lbs 12.8 oz (76.1 kg)  Body mass index is 27.08 kg/(m^2).  Weight Status:  Overweight BMI 25-29.9  IBW: 78.9 kg  % IBW: 96%  Weight History: Weight appears to be trending down over the past year. 7# loss indicated in the past 9 months, 29# loss in the past year (14.7%).   Per \"Care Everywhere\"   5/30/2017 - 79.3 kg (174 lb 13.2 oz)  3/20/2017 - 89.6 kg (197 lb 8.5 oz)      LABS  Labs reviewed    MEDICATIONS  Medications reviewed  NaCl + KCl IVF @ 75 mL/hr --> 75 ml/hr    Dosing Weight 76.1 kg    ASSESSED NUTRITION NEEDS PER APPROVED PRACTICE GUIDELINES:  Estimated Energy Needs: 7527-3997 kcals (25-30 Kcal/Kg)  Justification: maintenance  Estimated Protein Needs:  grams protein (1.2-1.5 g pro/Kg)  Justification: preservation of lean body mass  Estimated Fluid Needs: 0516-0264 mL (1 mL/Kcal)  Justification: maintenance    MALNUTRITION:  % Weight Loss:  Weight loss does not meet criteria for malnutrition " (14.5% x1 year)   % Intake:  No decreased intakes noted, though suspect pt w/ declining intakes over the past year.   Subcutaneous Fat Loss:  Orbital region mild depletion  Muscle Loss:  Temporal region mild depletion, Clavicle bone region mild depletion and Posterior calf region mild-moderate depletion - suspect mostly related to reduced mobility, sarcopenia w/ aging.   Fluid Retention:  None noted    Malnutrition Diagnosis: Non-Severe malnutrition  In Context of:  Chronic illness or disease    NUTRITION DIAGNOSIS:  Malnutrition related to suspected inadequate oral intakes d/t progressively decreasing appetite as evidenced by e/o fat/muscle wasting, progressive weight loss of up to 14.5% in the past year, coding for non-severe malnutrition.     NUTRITION INTERVENTIONS  Recommendations / Nutrition Prescription  Diet per SLP   Not appropriate for room service       Implementation  Nutrition education: Not appropriate at this time due to patient condition  Collaboration and Referral of Nutrition care: discussed appetite, intakes over admission w/ nursing at bedside.       Nutrition Goals  Pt to tolerate 50-75% adequate TID meals.       MONITORING AND EVALUATION:  Progress towards goals will be monitored and evaluated per protocol and Practice Guidelines    Louisa Diaz RD, LD  3rd floor/ICU: [phone]  All other floors: [phone]  Weekend/holiday: [phone]  Office: [phone]

## 2018-02-16 NOTE — PROGRESS NOTES
"[facility]  Hospitalist Progress Note  Siddhartha Samayoa MD 02/16/2018    Reason for Stay (Diagnosis): fever, hypoxia, altered mental status         Assessment and Plan:      Summary of Stay: Capo Jacques is a 87 year old male with history of hypertension, arthritis, coronary artery disease status post PCI, history of arterial fibrillation, dementia, minimally verbal at baseline.  He presented with confusion, fever and hypoxia admitted on 2/11/2018   Problem List:   1. Sepsis likely secondary to pneumonia.  -Possible aspiration pneumonia component   -Off oxygen , no fever in the last 24 hours.  -Continue IV Levaquin.    2. Severe dementia/ lewy body/some behavioral issues  -Fall precaution  -Patient is on Depakote, restart at home dose.    3. Metabolic encephalopathy-resolving.    4.  Hypokalemia   -Change IV fluids to NS with potassium chloride.  -Stop LR  5.  Dysphagia.  Evaluated by speech language pathology  -Continue dysphagia diet 1 with thin liquid.  -At increased risk of aspiration    Iazperchbem-3-7 days, if he continued to improve, no fever, electrolytes are okay.        Interval History (Subjective):      Patient seen and examined, confused, not coherent, not fully cooperative, resist wearing examined.  Denied pain                  Physical Exam:      Last Vital Signs:  /72 (BP Location: Left arm)  Temp 97  F (36.1  C) (Axillary)  Resp 18  Ht 1.81 m (5' 11.26\")  Wt 76.1 kg (167 lb 12.8 oz)  SpO2 94%  BMI 27.08 kg/m2  Fever 100.1 max    Constitutional: Awake, does not follow command.   Respiratory:  Coarse breath, lower lung field, no wheezing   Cardiovascular: Regular rate and rhythm, normal S1 and S2, and no murmur noted   Abdomen: Normal bowel sounds, soft, non-distended, non-tender   Skin: No rashes, no cyanosis, dry to touch   Neuro: Has frontal lobe signs such as hand grasping and rigidity all extremities  Some pill rolling hand movement and mild agitation   Fetal " posturing    Some minor attempts to get out of bed  Some verbal responses   Extremities: No edema, normal range of motion   Other(s): Did eat some pureed food    Lower fevers today   All other systems: Negative          Medications:        Current Facility-Administered Medications   Medication     NaCl 0.9 % 1,000 mL with potassium chloride 20 mEq/L infusion     potassium chloride SA (K-DUR/KLOR-CON M) CR tablet 20-40 mEq     potassium chloride (KLOR-CON) Packet 20-40 mEq     potassium chloride 10 mEq in 100 mL sterile water intermittent infusion (premix)     potassium chloride 10 mEq in 100 mL intermittent infusion with 10 mg lidocaine     potassium chloride 20 mEq in 50 mL intermittent infusion     lidocaine 1 % 0.5-5 mL     sodium chloride (PF) 0.9% PF flush 5-50 mL     acetaminophen (TYLENOL) tablet 650 mg     QUEtiapine (SEROquel) tablet 50 mg     polyethylene glycol (MIRALAX/GLYCOLAX) Packet 17 g     sodium chloride (PF) 0.9% PF flush 10-20 mL     sodium chloride (PF) 0.9% PF flush 10 mL     influenza Vac Split High-Dose (FLUZONE) injection 0.5 mL     levofloxacin (LEVAQUIN) infusion 500 mg     haloperidol lactate (HALDOL) injection 2 mg     acetaminophen (OFIRMEV) infusion 1,000 mg     naloxone (NARCAN) injection 0.1-0.4 mg     melatonin tablet 1 mg     heparin sodium PF injection 5,000 Units     ondansetron (ZOFRAN-ODT) ODT tab 4 mg    Or     ondansetron (ZOFRAN) injection 4 mg     acetaminophen (TYLENOL) tablet 650 mg     traZODone (DESYREL) tablet 50 mg     loperamide (IMODIUM) capsule 2 mg     ipratropium - albuterol 0.5 mg/2.5 mg/3 mL (DUONEB) neb solution 3 mL            Data:          Recent Labs  Lab 02/14/18  0600 02/12/18  0732 02/11/18  0235   WBC  --  9.6 8.3   HGB  --  10.6* 11.7*   HCT  --  31.3* 35.1*   MCV  --  94 95    143* 140*       Recent Labs  Lab 02/16/18  0724 02/15/18  2334 02/15/18  0843  02/14/18  0600 02/12/18  0707   *  --   --   --  145* 144   POTASSIUM 3.3*  3.3*  3.4 3.0*  < > 2.8* 3.5   CHLORIDE 113*  --   --   --  109 113*   CO2 25  --   --   --  30 28   ANIONGAP 7  --   --   --  6 3   GLC 94  --   --   --  78 77   BUN 5*  --   --   --  8 16   CR 0.53*  --   --   --  0.53* 0.56*   GFRESTIMATED >90  --   --   --  >90 >90   GFRESTBLACK >90  --   --   --  >90 >90   PARTH 8.7  --   --   --  8.0* 8.2*   < > = values in this interval not displayed.    Recent Labs  Lab 02/16/18  0724 02/14/18  0600 02/12/18  0707 02/11/18  0235   GLC 94 78 77 98

## 2018-02-17 LAB
BACTERIA SPEC CULT: NO GROWTH
BACTERIA SPEC CULT: NO GROWTH
Lab: NORMAL
Lab: NORMAL
PLATELET # BLD AUTO: 249 10E9/L (ref 150–450)
POTASSIUM SERPL-SCNC: 3.5 MMOL/L (ref 3.4–5.3)
SPECIMEN SOURCE: NORMAL
SPECIMEN SOURCE: NORMAL

## 2018-02-17 PROCEDURE — A9270 NON-COVERED ITEM OR SERVICE: HCPCS | Mod: GY | Performed by: INTERNAL MEDICINE

## 2018-02-17 PROCEDURE — 25000128 H RX IP 250 OP 636: Performed by: INTERNAL MEDICINE

## 2018-02-17 PROCEDURE — 12000000 ZZH R&B MED SURG/OB

## 2018-02-17 PROCEDURE — 25000132 ZZH RX MED GY IP 250 OP 250 PS 637: Mod: GY | Performed by: INTERNAL MEDICINE

## 2018-02-17 PROCEDURE — 85049 AUTOMATED PLATELET COUNT: CPT | Performed by: INTERNAL MEDICINE

## 2018-02-17 PROCEDURE — 84132 ASSAY OF SERUM POTASSIUM: CPT | Performed by: INTERNAL MEDICINE

## 2018-02-17 PROCEDURE — 36415 COLL VENOUS BLD VENIPUNCTURE: CPT | Performed by: INTERNAL MEDICINE

## 2018-02-17 PROCEDURE — 99232 SBSQ HOSP IP/OBS MODERATE 35: CPT | Performed by: INTERNAL MEDICINE

## 2018-02-17 RX ORDER — POTASSIUM CHLORIDE 1.5 G/1.58G
20 POWDER, FOR SOLUTION ORAL 2 TIMES DAILY
Status: DISCONTINUED | OUTPATIENT
Start: 2018-02-17 | End: 2018-02-18 | Stop reason: HOSPADM

## 2018-02-17 RX ADMIN — QUETIAPINE FUMARATE 50 MG: 50 TABLET, FILM COATED ORAL at 20:27

## 2018-02-17 RX ADMIN — HEPARIN SODIUM 5000 UNITS: 5000 INJECTION, SOLUTION INTRAVENOUS; SUBCUTANEOUS at 20:26

## 2018-02-17 RX ADMIN — Medication 1 MG: at 20:27

## 2018-02-17 RX ADMIN — POTASSIUM CHLORIDE 20 MEQ: 1.5 POWDER, FOR SOLUTION ORAL at 05:00

## 2018-02-17 RX ADMIN — ACETAMINOPHEN 650 MG: 325 TABLET, FILM COATED ORAL at 20:27

## 2018-02-17 RX ADMIN — HEPARIN SODIUM 5000 UNITS: 5000 INJECTION, SOLUTION INTRAVENOUS; SUBCUTANEOUS at 08:02

## 2018-02-17 RX ADMIN — LEVOFLOXACIN 500 MG: 5 INJECTION, SOLUTION INTRAVENOUS at 12:50

## 2018-02-17 RX ADMIN — TRAZODONE HYDROCHLORIDE 50 MG: 50 TABLET ORAL at 20:28

## 2018-02-17 RX ADMIN — POLYETHYLENE GLYCOL 3350 17 G: 17 POWDER, FOR SOLUTION ORAL at 08:02

## 2018-02-17 RX ADMIN — DIVALPROEX SODIUM 125 MG: 125 TABLET, DELAYED RELEASE ORAL at 21:25

## 2018-02-17 RX ADMIN — DIVALPROEX SODIUM 250 MG: 250 TABLET, DELAYED RELEASE ORAL at 17:49

## 2018-02-17 RX ADMIN — ACETAMINOPHEN 650 MG: 325 TABLET, FILM COATED ORAL at 08:01

## 2018-02-17 RX ADMIN — DIVALPROEX SODIUM 250 MG: 250 TABLET, DELAYED RELEASE ORAL at 08:01

## 2018-02-17 RX ADMIN — QUETIAPINE FUMARATE 50 MG: 50 TABLET, FILM COATED ORAL at 08:01

## 2018-02-17 RX ADMIN — POTASSIUM CHLORIDE 20 MEQ: 1.5 POWDER, FOR SOLUTION ORAL at 08:04

## 2018-02-17 ASSESSMENT — ACTIVITIES OF DAILY LIVING (ADL)
ADLS_ACUITY_SCORE: 35
ADLS_ACUITY_SCORE: 33

## 2018-02-17 NOTE — PROGRESS NOTES
Informed patient will ready for DC tomorrow and asked to check if [facility] will be able to accept patient  [place] [facility] Meg at [phone] who noted she will be able to accept patient back tomorrow. Meg requested for DC order for faxed to [phone].

## 2018-02-17 NOTE — PROGRESS NOTES
"[facility]  Hospitalist Progress Note  Siddhartha Samayoa MD 02/17/2018    Reason for Stay (Diagnosis): fever, hypoxia, altered mental status         Assessment and Plan:      Summary of Stay: Capo Jacques is a 87 year old male with history of hypertension, arthritis, coronary artery disease status post PCI, history of arterial fibrillation, dementia, minimally verbal at baseline.  He presented with confusion, fever and hypoxia admitted on 2/11/2018   Problem List:   1. Sepsis likely secondary to pneumonia.  -Possible aspiration pneumonia component   -Off oxygen , no fever in the last 24 hours.  -Continue Levaquin for today, day #7  -Monitor for fever.  -No cough or shortness of breath    2. Severe dementia/ lewy body/some behavioral issues  -Fall precaution  -Patient is on Depakote, restart at home dose.    3. Metabolic encephalopathy-resolving.    4.  Hypokalemia   -Change IV  fluids to NS with potassium chloride.  -Stop LR.    5.  Dysphagia.  Evaluated by speech language pathology  -Continue dysphagia diet 1 with thin liquid.  - He is at increased risk of aspiration.    Disposition-1 day, if he continued to improve.        Interval History (Subjective):      Patient seen and examined, resting comfortably, nt fully cooperative, does not give any history. Denied pain                  Physical Exam:      Last Vital Signs:  /54 (BP Location: Left arm)  Temp 98  F (36.7  C) (Axillary)  Resp 20  Ht 1.81 m (5' 11.26\")  Wt 76.1 kg (167 lb 12.8 oz)  SpO2 92%  BMI 27.08 kg/m2  Fever 100.1 max    Constitutional: Awake, does not follow command.  Does respond to questions.   Respiratory:  Coarse breath, lower lung field, no wheezing   Cardiovascular: Regular rate and rhythm, normal S1 and S2, and no murmur noted   Abdomen: Normal bowel sounds, soft, non-distended, non-tender   Skin: No rashes, no cyanosis, dry to touch   Neuro: Has frontal lobe signs such as hand grasping and rigidity all " extremities  Some pill rolling hand movement and mild agitation   Fetal posturing    Some minor attempts to get out of bed  Some verbal responses   Extremities: No edema, normal range of motion   Other(s): Did eat some pureed food    Lower fevers today   All other systems: Negative          Medications:        Current Facility-Administered Medications   Medication     potassium chloride (KLOR-CON) Packet 20 mEq     divalproex sodium delayed-release (DEPAKOTE) DR tablet 125 mg     divalproex sodium delayed-release (DEPAKOTE) DR tablet 250 mg     potassium chloride SA (K-DUR/KLOR-CON M) CR tablet 20-40 mEq     potassium chloride (KLOR-CON) Packet 20-40 mEq     potassium chloride 10 mEq in 100 mL sterile water intermittent infusion (premix)     potassium chloride 10 mEq in 100 mL intermittent infusion with 10 mg lidocaine     potassium chloride 20 mEq in 50 mL intermittent infusion     lidocaine 1 % 0.5-5 mL     sodium chloride (PF) 0.9% PF flush 5-50 mL     acetaminophen (TYLENOL) tablet 650 mg     QUEtiapine (SEROquel) tablet 50 mg     polyethylene glycol (MIRALAX/GLYCOLAX) Packet 17 g     sodium chloride (PF) 0.9% PF flush 10-20 mL     sodium chloride (PF) 0.9% PF flush 10 mL     influenza Vac Split High-Dose (FLUZONE) injection 0.5 mL     levofloxacin (LEVAQUIN) infusion 500 mg     haloperidol lactate (HALDOL) injection 2 mg     naloxone (NARCAN) injection 0.1-0.4 mg     melatonin tablet 1 mg     heparin sodium PF injection 5,000 Units     ondansetron (ZOFRAN-ODT) ODT tab 4 mg    Or     ondansetron (ZOFRAN) injection 4 mg     acetaminophen (TYLENOL) tablet 650 mg     traZODone (DESYREL) tablet 50 mg     loperamide (IMODIUM) capsule 2 mg     ipratropium - albuterol 0.5 mg/2.5 mg/3 mL (DUONEB) neb solution 3 mL            Data:          Recent Labs  Lab 02/17/18  0619 02/14/18  0600 02/12/18  0732 02/11/18  0235   WBC  --   --  9.6 8.3   HGB  --   --  10.6* 11.7*   HCT  --   --  31.3* 35.1*   MCV  --   --  94 95     196 143* 140*       Recent Labs  Lab 02/17/18  0619 02/16/18  0724 02/15/18  2334  02/14/18  0600 02/12/18  0707   NA  --  145*  --   --  145* 144   POTASSIUM 3.5 3.3*  3.3* 3.4  < > 2.8* 3.5   CHLORIDE  --  113*  --   --  109 113*   CO2  --  25  --   --  30 28   ANIONGAP  --  7  --   --  6 3   GLC  --  94  --   --  78 77   BUN  --  5*  --   --  8 16   CR  --  0.53*  --   --  0.53* 0.56*   GFRESTIMATED  --  >90  --   --  >90 >90   GFRESTBLACK  --  >90  --   --  >90 >90   PARTH  --  8.7  --   --  8.0* 8.2*   < > = values in this interval not displayed.    Recent Labs  Lab 02/16/18 0724 02/14/18  0600 02/12/18  0707 02/11/18  0235   GLC 94 78 77 98

## 2018-02-18 VITALS
OXYGEN SATURATION: 92 % | RESPIRATION RATE: 22 BRPM | HEIGHT: 71 IN | BODY MASS INDEX: 23.49 KG/M2 | DIASTOLIC BLOOD PRESSURE: 60 MMHG | SYSTOLIC BLOOD PRESSURE: 140 MMHG | TEMPERATURE: 96.8 F | WEIGHT: 167.8 LBS

## 2018-02-18 PROCEDURE — 25000128 H RX IP 250 OP 636: Performed by: INTERNAL MEDICINE

## 2018-02-18 PROCEDURE — 99239 HOSP IP/OBS DSCHRG MGMT >30: CPT | Performed by: INTERNAL MEDICINE

## 2018-02-18 PROCEDURE — A9270 NON-COVERED ITEM OR SERVICE: HCPCS | Mod: GY | Performed by: INTERNAL MEDICINE

## 2018-02-18 PROCEDURE — 25000132 ZZH RX MED GY IP 250 OP 250 PS 637: Mod: GY | Performed by: INTERNAL MEDICINE

## 2018-02-18 RX ORDER — POTASSIUM CHLORIDE 1.5 G/1.58G
40 POWDER, FOR SOLUTION ORAL DAILY
Qty: 15 TABLET | Refills: 0 | Status: ON HOLD | OUTPATIENT
Start: 2018-02-18 | End: 2019-02-03

## 2018-02-18 RX ADMIN — ACETAMINOPHEN 650 MG: 325 TABLET, FILM COATED ORAL at 08:04

## 2018-02-18 RX ADMIN — POTASSIUM CHLORIDE 20 MEQ: 1.5 POWDER, FOR SOLUTION ORAL at 08:05

## 2018-02-18 RX ADMIN — HEPARIN SODIUM 5000 UNITS: 5000 INJECTION, SOLUTION INTRAVENOUS; SUBCUTANEOUS at 08:04

## 2018-02-18 RX ADMIN — DIVALPROEX SODIUM 250 MG: 250 TABLET, DELAYED RELEASE ORAL at 08:04

## 2018-02-18 RX ADMIN — QUETIAPINE FUMARATE 50 MG: 50 TABLET, FILM COATED ORAL at 08:04

## 2018-02-18 ASSESSMENT — ACTIVITIES OF DAILY LIVING (ADL)
ADLS_ACUITY_SCORE: 35
ADLS_ACUITY_SCORE: 33
ADLS_ACUITY_SCORE: 35
ADLS_ACUITY_SCORE: 35

## 2018-02-18 NOTE — PROGRESS NOTES
Spoke with patient wife regarding transport cost.  Wife agreed to cost.   Scheduled transport through [facility] at 1230.   Spoke with Rebecca at [facility] services informed her patient will  at 1230. Rebecca requested DC orders.   Faxed DC orders to Rebecca.

## 2018-02-19 NOTE — PROGRESS NOTES
Speech Language Therapy Discharge Summary    Reason for therapy discharge:    Discharged to long term care facility.    Progress towards therapy goal(s). See goals on Care Plan in Epic electronic health record for goal details.  Goals partially met.  Barriers to achieving goals:   discharge from facility.    Therapy recommendation(s):    Continued therapy is recommended.  Rationale/Recommendations:  Recommend ongoing SLP services for dysphagia, follow up to ensure use of safe feeding/safe swallowing strategies and effectiveness of modified diet. (Discharged on dysphagia diet 1 and nectar thick liquids with 1:1 supervision. Pt should be fully upright for all PO, take small single sips/bites, slow pacing, and caregivers to verify each swallow response.)

## 2018-02-19 NOTE — DISCHARGE SUMMARY
Admit Date:     02/11/2018   Discharge Date:     02/18/2018      PRIMARY CARE PHYSICIAN:  Lexa Peterson MD      DISCHARGE DIAGNOSES:   1.  Sepsis secondary to pneumonia.   2.  Severe dementia with some behavioral issues.   3.  Infectious encephalopathy.   4.  Hypokalemia.   5.  Dysphagia.      DISPOSITION:  To group home.      DISCHARGE MEDICATIONS:  Reviewed and reconciled as in electronic medical record.      DISCHARGE CONDITION:  Stable.      DISCHARGE VITAL SIGNS:  Blood pressure 160/70, temperature 96, oxygen saturation 98%.   HEENT:  Pink nonicteric.  Extraocular muscle movement intact.   NECK:  Supple, no JVD, no thyromegaly.   CHEST:  Good air entry bilaterally.  No wheezing, crackles or rales.   CARDIOVASCULAR:  S1, S2 were heard.  No gallop or murmur.   ABDOMEN:  Soft, nontender, nondistended.   NEUROLOGIC:  Some rigidity of his all extremities.Fetal posturing.   EXTREMITIES:  No edema, cyanosis or clubbing.   PSYCHIATRIC:  No agitation.      CONSULTATIONS:  None.      DISPOSITION:  To group home.      DISCHARGE DIET:  Regular.      DISCHARGE ACTIVITY:  As tolerated with help.      DISCHARGE FOLLOWUP:  Primary care provider in 1 week.  The patient needs to have his basic metabolic panel checked to adjust his potassium chloride and to check his creatinine.      BRIEF HISTORY AND HOSPITAL COURSE:  Capo Jacques is an 87-year-old gentleman with past medical history of hypertension, coronary artery disease, status post PCI, history of atrial fibrillation, significant dementia minimal variable at baseline, presented with confusion, fever and hypoxia and admitted on 2/11/2018 with sepsis secondary to pneumonia.  Initially aspiration was also entertained.  The patient did not have any fever, no significant chest x-ray finding.  He was off oxygen.  He was treated with Levaquin for a total of 7 days.  No shortness of breath.  His mental status is at his baseline and he is minimally verbal, has significant  dementia and he was consistently having hypokalemia initially potassium was added to the IV fluid and replaced per protocol and discharged on oral potassium.  The patient has history of dysphasia evaluated by speech and swallow, which he is on dysphagia diet 1 with thin liquid and he is at increased risk of aspiration.  The patient does not appear to have any questions and is discharged in stable condition to a group home.        Total time spent, over 30 minutes coordinating his discharge.         RUBENS ANDRE MD             D: 2018   T: 2018   MT: PREM      Name:     KATRIN FORTE   MRN:      [id number]        Account:        [id number]   :      1930           Admit Date:     2018                                  Discharge Date: 2018      Document: [id number]

## 2019-01-31 ENCOUNTER — APPOINTMENT (OUTPATIENT)
Dept: CT IMAGING | Facility: CLINIC | Age: 84
End: 2019-01-31
Payer: MEDICARE

## 2019-01-31 ENCOUNTER — HOSPITAL ENCOUNTER (EMERGENCY)
Facility: CLINIC | Age: 84
Discharge: HOME | End: 2019-01-31
Attending: EMERGENCY MEDICINE | Admitting: EMERGENCY MEDICINE
Payer: MEDICARE

## 2019-01-31 VITALS
HEART RATE: 71 BPM | OXYGEN SATURATION: 95 % | SYSTOLIC BLOOD PRESSURE: 159 MMHG | TEMPERATURE: 98.2 F | DIASTOLIC BLOOD PRESSURE: 87 MMHG
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
## 2019-01-31 DIAGNOSIS — S01.01XA LACERATION OF SCALP, INITIAL ENCOUNTER: ICD-10-CM

## 2019-01-31 PROCEDURE — 70450 CT HEAD/BRAIN W/O DYE: CPT

## 2019-01-31 PROCEDURE — 12001 RPR S/N/AX/GEN/TRNK 2.5CM/<: CPT

## 2019-01-31 PROCEDURE — 72125 CT NECK SPINE W/O DYE: CPT

## 2019-01-31 PROCEDURE — 25000125 ZZHC RX 250

## 2019-01-31 PROCEDURE — 99283 EMERGENCY DEPT VISIT LOW MDM: CPT

## 2019-01-31 RX ORDER — LIDOCAINE HYDROCHLORIDE AND EPINEPHRINE 10; 10 MG/ML; UG/ML
INJECTION, SOLUTION INFILTRATION; PERINEURAL
Status: DISCONTINUED
Start: 2019-01-31 | End: 2019-01-31 | Stop reason: HOSPADM

## 2019-01-31 RX ADMIN — Medication 3 ML: at 17:13

## 2019-01-31 ASSESSMENT — ENCOUNTER SYMPTOMS
MYALGIAS: 0
HEADACHES: 0
WOUND: 1

## 2019-01-31 NOTE — ED PROVIDER NOTES
History     Chief Complaint:  Fall      HPI   History limited secondary to the patient being a poor historian. History supplemented by EMS report.    Capo Jacques is a 88 year old male with a history of hypertension, hyperlipidemia, atrial fibrillation, and CAD who presents to the ED via EMS for evaluation after a fall. Per EMS report, the patient was at his group home earlier today, when he suddenly fell forward out of his wheelchair. The patient sustained a laceration to the crown of his scalp and skin tear to his right fourth finger, although there was no loss of consciousness. EMS was called, who found the bleeding to be controlled upon arrival. The patient's family was informed, who consented to his transport to the hospital. Here in the ED, the patient denies that he hit his head and denies any pains.    Allergies:  Metoprolol Succinate  Septra [Sulfa Drugs]  Doxycycline     Medications:    Depakote  Hypromellose  Imodium  Lorazepam  Magnesium hydroxide  Melatonin  Miralax  Klor-Con  Quetiapine fumarate  Trazodone    Past Medical History:    Osteoarthrosis  Hypertension  Cataracts  Actinic keratosis  Sepsis  Hyperlipidemia  Atrial fibrillation  CAD    Past Surgical History:    Knee arthroscopy with lateral meniscectomy  Cataract extraction  Retinal detachment surgery  Coronary stent placement  Tonsillectomy  Vasectomy    Family History:    Diabetes  Throat cancer  Stomach cancer  Heart disease  Stroke    Social History:  Former smoker   Uses alcohol.   Marital Status:   [2]  Currently resides in a group home.     Review of Systems   Unable to perform ROS: Dementia   Musculoskeletal: Negative for myalgias.   Skin: Positive for wound.   Neurological: Negative for headaches.     Physical Exam     Patient Vitals for the past 24 hrs:   BP Temp Temp src Pulse Heart Rate SpO2   01/31/19 2010 159/87 -- -- 71 -- 95 %   01/31/19 1804 143/61 -- -- 69 -- --   01/31/19 1647 135/74 98.2  F (36.8  C) Oral 60 57  99 %        Physical Exam  General: Patient is alert and interactive when I enter the room, pleasantly demented  Head:  The scalp, face, and head appear normal  Eyes:  Conjunctivae are normal  ENT:    The nose is normal    Pinnae are normal    External acoustic canals are normal  Neck:  Trachea midline no tenderness to posterior neck  CV:  Pulses are normal, regular rate and rhythm  Resp:  No respiratory distress, clear to auscultation  Abdomen:      Soft, non-tender, non-distended  Musc:  Normal muscular tone    No major joint effusions    No asymmetric leg swelling  Skin:  Abrasion to right fifth finger  Neuro: Speech is normal and fluent. Face is symmetric.     Moving all extremities well.  Not oriented to self or place.  Psych: Awake. Alert.  Normal affect.  Appropriate interactions.       Emergency Department Course   Imaging:  Radiographic findings were communicated with the patient and group home staff who voiced understanding of the findings.    CT Head without contrast:   Diffuse cerebral volume loss and cerebral white matter  changes consistent with chronic small vessel ischemic disease. No  evidence for acute intracranial pathology, as per radiology.    Radiation dose for this scan was reduced using automated exposure  control, adjustment of the mA and/or kV according to patient size, or  iterative reconstruction technique.    CT Cervical Spine without contrast:   Diffuse degenerative changes of the cervical spine. No  evidence for fracture or traumatic malalignment, as per radiology.    Radiation dose for this scan was reduced using automated exposure  control, adjustment of the mA and/or kV according to patient size, or  iterative reconstruction technique.    Procedures:     Laceration Repair      LACERATION:  A simple clean 2 cm laceration.    LOCATION:  scalp    ANESTHESIA:  Local using Lidocaine 1% with Epinephrine total of 2 mLs and LET - Topical.    PREPARATION:  Irrigation with Normal  Saline.    DEBRIDEMENT:  no debridement.    CLOSURE:  Wound was closed with 5 Staples.    Interventions:  1713 LET 3 mL Topical    Emergency Department Course:  Nursing notes and vitals reviewed. (1706) I performed an exam of the patient as documented above.     IV inserted. Medicine administered as documented above. Blood drawn. This was sent to the lab for further testing, results above.    The patient was sent for a head CT and cervical spine CT while in the emergency department, findings above.     (1828) I rechecked the patient and discussed the results of his workup thus far. I performed a laceration repair, as noted above. There were no complications of the procedure.     Findings and plan explained to the Patient. Patient discharged home with instructions regarding supportive care, medications, and reasons to return. The importance of close follow-up was reviewed.     I personally reviewed the laboratory results with the Patient and group home staff and answered all related questions prior to discharge.        Impression & Plan    Medical Decision Making:  Capo Jacques is a 88 year old male who presents after a mechanical fall.  Patient fell forward off his wheelchair and hit his head.  He sustained abrasion to his right hand in a scalp laceration.  He has a post in place that is limited intervention and it seems likely he fell out of his wheelchair.  He seems to be acting at his baseline and has quite severe dementia.  We did do a head CT and a C-spine.  These were negative.  Scalp laceration was repaired with sutures.  No further trauma workup needed.  At this point this was likely a mechanical fall and I think patient should follow-up with his primary care doctor for suture removal.  Patient discharged back to memory care unit.  Diagnosis:    ICD-10-CM    1. Fall, initial encounter W19.XXXA    2. Laceration of scalp, initial encounter S01.01XA        Disposition:  discharged to home    Scribe  Disclosure:  I, Elmira Limon, am serving as a scribe on 1/31/2019 at 5:06 PM to personally document services performed by Catina Recio MD based on my observations and the provider's statements to me.      Elmira Limon  1/31/2019   [facility] EMERGENCY DEPARTMENT       Catina Recio MD  02/03/19 1056

## 2019-01-31 NOTE — ED AVS SNAPSHOT
[facility]  [address]  [facility] [address]  Phone:  [phone]  Fax:  [phone]                                    Capo Jacques   MRN: [id number]    Department:  [facility]   Date of Visit:  1/31/2019           After Visit Summary Signature Page    I have received my discharge instructions, and my questions have been answered. I have discussed any challenges I see with this plan with the nurse or doctor.    ..........................................................................................................................................  Patient/Patient Representative Signature      ..........................................................................................................................................  Patient Representative Print Name and Relationship to Patient    ..................................................               ................................................  Date                                   Time    ..........................................................................................................................................  Reviewed by Signature/Title    ...................................................              ..............................................  Date                                               Time          22EPIC Rev 08/18

## 2019-01-31 NOTE — ED TRIAGE NOTES
Pt arrives from [facility] for dementia via ems after suffering a fall. Staff reports pt was in his wheelchair when he took a fall forward. No LOC. Head lac to scalp, bleeding controlled and guilherme. Lac to RUE pinky covered with gauze at time of arrival. Poor historian d/t dementia. EMS reports family was informed

## 2019-01-31 NOTE — ED NOTES
Bed: ED01  Expected date: 1/31/19  Expected time: 4:28 PM  Means of arrival: Hospital Transport  Comments:  HE 10

## 2019-02-01 NOTE — ED NOTES
Assume care of patient. Introduce self as patient nurse. Patient currently laying in bed. Patient was dressed and kept comfortable in bed. Call light within reach. Will continue to monitor.

## 2019-02-01 NOTE — ED NOTES
Patient attempted getting out of bed multiple times. Multiple redirections and distractions implemented. Patient moved closer to nursing station for closer monitoring related to fall precaution.

## 2019-02-03 ENCOUNTER — HOSPITAL ENCOUNTER (INPATIENT)
Facility: CLINIC | Age: 84
LOS: 5 days | Discharge: NURSING, REHAB OR LONG-TERM CARE | DRG: 056 | End: 2019-02-08
Attending: EMERGENCY MEDICINE | Admitting: INTERNAL MEDICINE
Payer: MEDICARE

## 2019-02-03 ENCOUNTER — APPOINTMENT (OUTPATIENT)
Dept: CT IMAGING | Facility: CLINIC | Age: 84
DRG: 056 | End: 2019-02-03
Payer: MEDICARE

## 2019-02-03 DIAGNOSIS — R19.7 DIARRHEA, UNSPECIFIED TYPE: ICD-10-CM

## 2019-02-03 DIAGNOSIS — F02.818 LEWY BODY DEMENTIA WITH BEHAVIORAL DISTURBANCE (H): ICD-10-CM

## 2019-02-03 DIAGNOSIS — E87.6 HYPOKALEMIA: ICD-10-CM

## 2019-02-03 DIAGNOSIS — N39.0 URINARY TRACT INFECTION WITHOUT HEMATURIA, SITE UNSPECIFIED: Primary | ICD-10-CM

## 2019-02-03 DIAGNOSIS — R52 PAIN: ICD-10-CM

## 2019-02-03 DIAGNOSIS — R41.0 CONFUSION: ICD-10-CM

## 2019-02-03 DIAGNOSIS — G31.83 LEWY BODY DEMENTIA WITH BEHAVIORAL DISTURBANCE (H): ICD-10-CM

## 2019-02-03 LAB
ALBUMIN SERPL-MCNC: 2.9 G/DL (ref 3.4–5)
ALBUMIN UR-MCNC: 10 MG/DL
ALP SERPL-CCNC: 59 U/L (ref 40–150)
ALT SERPL W P-5'-P-CCNC: 19 U/L (ref 0–70)
ANION GAP SERPL CALCULATED.3IONS-SCNC: 5 MMOL/L (ref 3–14)
APPEARANCE UR: ABNORMAL
AST SERPL W P-5'-P-CCNC: 29 U/L (ref 0–45)
BACTERIA #/AREA URNS HPF: ABNORMAL /HPF
BASOPHILS # BLD AUTO: 0 10E9/L (ref 0–0.2)
BASOPHILS NFR BLD AUTO: 0.2 %
BILIRUB SERPL-MCNC: 0.7 MG/DL (ref 0.2–1.3)
BILIRUB UR QL STRIP: NEGATIVE
BUN SERPL-MCNC: 12 MG/DL (ref 7–30)
CALCIUM SERPL-MCNC: 8.3 MG/DL (ref 8.5–10.1)
CHLORIDE SERPL-SCNC: 108 MMOL/L (ref 94–109)
CO2 SERPL-SCNC: 29 MMOL/L (ref 20–32)
COLOR UR AUTO: YELLOW
CREAT SERPL-MCNC: 0.61 MG/DL (ref 0.66–1.25)
DIFFERENTIAL METHOD BLD: ABNORMAL
EOSINOPHIL # BLD AUTO: 0.2 10E9/L (ref 0–0.7)
EOSINOPHIL NFR BLD AUTO: 3.6 %
ERYTHROCYTE [DISTWIDTH] IN BLOOD BY AUTOMATED COUNT: 13.4 % (ref 10–15)
GFR SERPL CREATININE-BSD FRML MDRD: 89 ML/MIN/{1.73_M2}
GLUCOSE SERPL-MCNC: 87 MG/DL (ref 70–99)
GLUCOSE UR STRIP-MCNC: NEGATIVE MG/DL
HCT VFR BLD AUTO: 34.4 % (ref 40–53)
HGB BLD-MCNC: 11.5 G/DL (ref 13.3–17.7)
HGB UR QL STRIP: ABNORMAL
HYALINE CASTS #/AREA URNS LPF: 3 /LPF (ref 0–2)
IMM GRANULOCYTES # BLD: 0 10E9/L (ref 0–0.4)
IMM GRANULOCYTES NFR BLD: 0 %
KETONES UR STRIP-MCNC: NEGATIVE MG/DL
LEUKOCYTE ESTERASE UR QL STRIP: ABNORMAL
LYMPHOCYTES # BLD AUTO: 1.8 10E9/L (ref 0.8–5.3)
LYMPHOCYTES NFR BLD AUTO: 33.3 %
MCH RBC QN AUTO: 31.9 PG (ref 26.5–33)
MCHC RBC AUTO-ENTMCNC: 33.4 G/DL (ref 31.5–36.5)
MCV RBC AUTO: 95 FL (ref 78–100)
MONOCYTES # BLD AUTO: 0.5 10E9/L (ref 0–1.3)
MONOCYTES NFR BLD AUTO: 9.6 %
MUCOUS THREADS #/AREA URNS LPF: PRESENT /LPF
NEUTROPHILS # BLD AUTO: 2.9 10E9/L (ref 1.6–8.3)
NEUTROPHILS NFR BLD AUTO: 53.3 %
NITRATE UR QL: NEGATIVE
NRBC # BLD AUTO: 0 10*3/UL
NRBC BLD AUTO-RTO: 0 /100
PH UR STRIP: 6.5 PH (ref 5–7)
PLATELET # BLD AUTO: 145 10E9/L (ref 150–450)
POTASSIUM SERPL-SCNC: 3.7 MMOL/L (ref 3.4–5.3)
PROT SERPL-MCNC: 6.3 G/DL (ref 6.8–8.8)
RBC # BLD AUTO: 3.61 10E12/L (ref 4.4–5.9)
RBC #/AREA URNS AUTO: 7 /HPF (ref 0–2)
SODIUM SERPL-SCNC: 142 MMOL/L (ref 133–144)
SOURCE: ABNORMAL
SP GR UR STRIP: 1.01 (ref 1–1.03)
UROBILINOGEN UR STRIP-MCNC: NORMAL MG/DL (ref 0–2)
WBC # BLD AUTO: 5.5 10E9/L (ref 4–11)
WBC #/AREA URNS AUTO: 105 /HPF (ref 0–5)

## 2019-02-03 PROCEDURE — 80053 COMPREHEN METABOLIC PANEL: CPT | Performed by: EMERGENCY MEDICINE

## 2019-02-03 PROCEDURE — 99207 ZZC CDG-MDM COMPONENT: MEETS MODERATE - UP CODED: CPT | Performed by: INTERNAL MEDICINE

## 2019-02-03 PROCEDURE — 96375 TX/PRO/DX INJ NEW DRUG ADDON: CPT

## 2019-02-03 PROCEDURE — 99285 EMERGENCY DEPT VISIT HI MDM: CPT | Mod: 25

## 2019-02-03 PROCEDURE — 87088 URINE BACTERIA CULTURE: CPT | Performed by: EMERGENCY MEDICINE

## 2019-02-03 PROCEDURE — A9270 NON-COVERED ITEM OR SERVICE: HCPCS | Mod: GY | Performed by: INTERNAL MEDICINE

## 2019-02-03 PROCEDURE — 87086 URINE CULTURE/COLONY COUNT: CPT | Performed by: EMERGENCY MEDICINE

## 2019-02-03 PROCEDURE — 96365 THER/PROPH/DIAG IV INF INIT: CPT

## 2019-02-03 PROCEDURE — 87186 SC STD MICRODIL/AGAR DIL: CPT | Performed by: EMERGENCY MEDICINE

## 2019-02-03 PROCEDURE — 25000128 H RX IP 250 OP 636: Performed by: INTERNAL MEDICINE

## 2019-02-03 PROCEDURE — 81001 URINALYSIS AUTO W/SCOPE: CPT | Performed by: EMERGENCY MEDICINE

## 2019-02-03 PROCEDURE — 96366 THER/PROPH/DIAG IV INF ADDON: CPT

## 2019-02-03 PROCEDURE — 85025 COMPLETE CBC W/AUTO DIFF WBC: CPT | Performed by: EMERGENCY MEDICINE

## 2019-02-03 PROCEDURE — 25000128 H RX IP 250 OP 636

## 2019-02-03 PROCEDURE — 96376 TX/PRO/DX INJ SAME DRUG ADON: CPT

## 2019-02-03 PROCEDURE — 12000000 ZZH R&B MED SURG/OB

## 2019-02-03 PROCEDURE — 25000132 ZZH RX MED GY IP 250 OP 250 PS 637: Mod: GY | Performed by: INTERNAL MEDICINE

## 2019-02-03 PROCEDURE — 25000128 H RX IP 250 OP 636: Performed by: EMERGENCY MEDICINE

## 2019-02-03 PROCEDURE — 99223 1ST HOSP IP/OBS HIGH 75: CPT | Mod: AI | Performed by: INTERNAL MEDICINE

## 2019-02-03 PROCEDURE — 70450 CT HEAD/BRAIN W/O DYE: CPT

## 2019-02-03 RX ORDER — LORAZEPAM 0.5 MG/1
0.5 TABLET ORAL EVERY 6 HOURS PRN
Status: DISCONTINUED | OUTPATIENT
Start: 2019-02-03 | End: 2019-02-04

## 2019-02-03 RX ORDER — NALOXONE HYDROCHLORIDE 0.4 MG/ML
.1-.4 INJECTION, SOLUTION INTRAMUSCULAR; INTRAVENOUS; SUBCUTANEOUS
Status: DISCONTINUED | OUTPATIENT
Start: 2019-02-03 | End: 2019-02-08 | Stop reason: HOSPADM

## 2019-02-03 RX ORDER — LOPERAMIDE HYDROCHLORIDE 2 MG/1
2 TABLET ORAL 4 TIMES DAILY PRN
Status: ON HOLD | COMMUNITY
End: 2019-02-08

## 2019-02-03 RX ORDER — ONDANSETRON 2 MG/ML
4 INJECTION INTRAMUSCULAR; INTRAVENOUS EVERY 6 HOURS PRN
Status: DISCONTINUED | OUTPATIENT
Start: 2019-02-03 | End: 2019-02-08 | Stop reason: HOSPADM

## 2019-02-03 RX ORDER — ZINC OXIDE
OINTMENT (GRAM) TOPICAL 2 TIMES DAILY PRN
COMMUNITY

## 2019-02-03 RX ORDER — ACETAMINOPHEN 325 MG/1
650 TABLET ORAL EVERY 4 HOURS PRN
Status: DISCONTINUED | OUTPATIENT
Start: 2019-02-03 | End: 2019-02-03

## 2019-02-03 RX ORDER — GUAIFENESIN AND DEXTROMETHORPHAN HYDROBROMIDE 100; 10 MG/5ML; MG/5ML
5-10 SOLUTION ORAL EVERY 4 HOURS PRN
COMMUNITY

## 2019-02-03 RX ORDER — MORPHINE SULFATE 2 MG/ML
INJECTION, SOLUTION INTRAMUSCULAR; INTRAVENOUS
Status: COMPLETED
Start: 2019-02-03 | End: 2019-02-03

## 2019-02-03 RX ORDER — MORPHINE SULFATE 2 MG/ML
1-2 INJECTION, SOLUTION INTRAMUSCULAR; INTRAVENOUS
Status: DISCONTINUED | OUTPATIENT
Start: 2019-02-03 | End: 2019-02-06

## 2019-02-03 RX ORDER — ACETAMINOPHEN 650 MG/1
650 SUPPOSITORY RECTAL EVERY 4 HOURS PRN
Status: ON HOLD | COMMUNITY
End: 2019-02-08

## 2019-02-03 RX ORDER — LORAZEPAM 2 MG/ML
0.5 INJECTION INTRAMUSCULAR EVERY 4 HOURS PRN
Status: DISCONTINUED | OUTPATIENT
Start: 2019-02-03 | End: 2019-02-04

## 2019-02-03 RX ORDER — ACETAMINOPHEN 500 MG
1000 TABLET ORAL 3 TIMES DAILY
Status: ON HOLD | COMMUNITY
End: 2019-02-08

## 2019-02-03 RX ORDER — TERAZOSIN 5 MG/1
5 CAPSULE ORAL AT BEDTIME
COMMUNITY

## 2019-02-03 RX ORDER — QUETIAPINE FUMARATE 200 MG/1
200 TABLET, FILM COATED ORAL 2 TIMES DAILY
Status: ON HOLD | COMMUNITY
End: 2019-02-08

## 2019-02-03 RX ORDER — CEFTRIAXONE 1 G/1
1 INJECTION, POWDER, FOR SOLUTION INTRAMUSCULAR; INTRAVENOUS ONCE
Status: COMPLETED | OUTPATIENT
Start: 2019-02-03 | End: 2019-02-03

## 2019-02-03 RX ORDER — DIVALPROEX SODIUM 125 MG/1
125 TABLET, DELAYED RELEASE ORAL AT BEDTIME
Status: DISCONTINUED | OUTPATIENT
Start: 2019-02-03 | End: 2019-02-04

## 2019-02-03 RX ORDER — MORPHINE SULFATE 2 MG/ML
2 INJECTION, SOLUTION INTRAMUSCULAR; INTRAVENOUS ONCE
Status: COMPLETED | OUTPATIENT
Start: 2019-02-03 | End: 2019-02-03

## 2019-02-03 RX ORDER — AMOXICILLIN 250 MG
1 CAPSULE ORAL 2 TIMES DAILY
COMMUNITY

## 2019-02-03 RX ORDER — POTASSIUM CHLORIDE 1.5 G/1.58G
40 POWDER, FOR SOLUTION ORAL DAILY
Status: DISCONTINUED | OUTPATIENT
Start: 2019-02-03 | End: 2019-02-08 | Stop reason: HOSPADM

## 2019-02-03 RX ORDER — ACETAMINOPHEN 650 MG/1
650 SUPPOSITORY RECTAL EVERY 4 HOURS PRN
Status: DISCONTINUED | OUTPATIENT
Start: 2019-02-03 | End: 2019-02-08 | Stop reason: HOSPADM

## 2019-02-03 RX ORDER — LANOLIN ALCOHOL/MO/W.PET/CERES
3 CREAM (GRAM) TOPICAL AT BEDTIME
Status: DISCONTINUED | OUTPATIENT
Start: 2019-02-03 | End: 2019-02-08 | Stop reason: HOSPADM

## 2019-02-03 RX ORDER — ASPIRIN 81 MG/1
81 TABLET, CHEWABLE ORAL DAILY
COMMUNITY

## 2019-02-03 RX ORDER — SODIUM CHLORIDE, SODIUM LACTATE, POTASSIUM CHLORIDE, CALCIUM CHLORIDE 600; 310; 30; 20 MG/100ML; MG/100ML; MG/100ML; MG/100ML
INJECTION, SOLUTION INTRAVENOUS CONTINUOUS
Status: CANCELLED | OUTPATIENT
Start: 2019-02-03

## 2019-02-03 RX ORDER — TRAZODONE HYDROCHLORIDE 50 MG/1
50 TABLET, FILM COATED ORAL AT BEDTIME
Status: ON HOLD | COMMUNITY
End: 2019-02-08

## 2019-02-03 RX ORDER — LOPERAMIDE HCL 2 MG
2 CAPSULE ORAL 4 TIMES DAILY PRN
Status: DISCONTINUED | OUTPATIENT
Start: 2019-02-03 | End: 2019-02-08 | Stop reason: HOSPADM

## 2019-02-03 RX ORDER — DIVALPROEX SODIUM 250 MG/1
250 TABLET, DELAYED RELEASE ORAL 2 TIMES DAILY
Status: DISCONTINUED | OUTPATIENT
Start: 2019-02-03 | End: 2019-02-04

## 2019-02-03 RX ORDER — ACETAMINOPHEN 325 MG/1
650 TABLET ORAL 2 TIMES DAILY
Status: DISCONTINUED | OUTPATIENT
Start: 2019-02-03 | End: 2019-02-08 | Stop reason: HOSPADM

## 2019-02-03 RX ORDER — NYSTATIN 100000 [USP'U]/G
POWDER TOPICAL 2 TIMES DAILY PRN
COMMUNITY

## 2019-02-03 RX ORDER — ONDANSETRON 4 MG/1
4 TABLET, ORALLY DISINTEGRATING ORAL EVERY 6 HOURS PRN
Status: DISCONTINUED | OUTPATIENT
Start: 2019-02-03 | End: 2019-02-08 | Stop reason: HOSPADM

## 2019-02-03 RX ORDER — MORPHINE SULFATE 30 MG/1
5 TABLET ORAL 4 TIMES DAILY
Status: ON HOLD | COMMUNITY
End: 2019-02-08

## 2019-02-03 RX ORDER — MAGNESIUM HYDROXIDE/ALUMINUM HYDROXICE/SIMETHICONE 120; 1200; 1200 MG/30ML; MG/30ML; MG/30ML
30 SUSPENSION ORAL
COMMUNITY

## 2019-02-03 RX ORDER — HYOSCYAMINE SULFATE 0.125 MG
0.12 TABLET,DISINTEGRATING ORAL EVERY 4 HOURS PRN
Status: ON HOLD | COMMUNITY
End: 2019-02-08

## 2019-02-03 RX ORDER — POLYETHYLENE GLYCOL 3350 17 G/17G
17 POWDER, FOR SOLUTION ORAL DAILY
Status: DISCONTINUED | OUTPATIENT
Start: 2019-02-03 | End: 2019-02-08 | Stop reason: HOSPADM

## 2019-02-03 RX ORDER — GABAPENTIN 100 MG/1
100 CAPSULE ORAL 3 TIMES DAILY
Status: ON HOLD | COMMUNITY
End: 2019-02-08

## 2019-02-03 RX ORDER — CEFTRIAXONE 1 G/1
1 INJECTION, POWDER, FOR SOLUTION INTRAMUSCULAR; INTRAVENOUS EVERY 24 HOURS
Status: DISCONTINUED | OUTPATIENT
Start: 2019-02-04 | End: 2019-02-05

## 2019-02-03 RX ORDER — QUETIAPINE FUMARATE 200 MG/1
200 TABLET, FILM COATED ORAL
Status: ON HOLD | COMMUNITY
End: 2019-02-08

## 2019-02-03 RX ORDER — QUETIAPINE FUMARATE 100 MG/1
100 TABLET, FILM COATED ORAL DAILY
Status: ON HOLD | COMMUNITY
End: 2019-02-08

## 2019-02-03 RX ORDER — QUETIAPINE FUMARATE 50 MG/1
50 TABLET, FILM COATED ORAL 2 TIMES DAILY
Status: DISCONTINUED | OUTPATIENT
Start: 2019-02-03 | End: 2019-02-04

## 2019-02-03 RX ORDER — TRAZODONE HYDROCHLORIDE 50 MG/1
50 TABLET, FILM COATED ORAL
Status: DISCONTINUED | OUTPATIENT
Start: 2019-02-03 | End: 2019-02-08 | Stop reason: HOSPADM

## 2019-02-03 RX ORDER — ACETAMINOPHEN 325 MG/1
650 TABLET ORAL EVERY 4 HOURS PRN
Status: DISCONTINUED | OUTPATIENT
Start: 2019-02-03 | End: 2019-02-08 | Stop reason: HOSPADM

## 2019-02-03 RX ORDER — TRAZODONE HYDROCHLORIDE 100 MG/1
200 TABLET ORAL AT BEDTIME
Status: DISCONTINUED | OUTPATIENT
Start: 2019-02-03 | End: 2019-02-08 | Stop reason: HOSPADM

## 2019-02-03 RX ORDER — LOPERAMIDE HYDROCHLORIDE 2 MG/1
4 TABLET ORAL PRN
Status: ON HOLD | COMMUNITY
End: 2019-02-08

## 2019-02-03 RX ORDER — BISACODYL 10 MG
10 SUPPOSITORY, RECTAL RECTAL EVERY OTHER DAY
COMMUNITY

## 2019-02-03 RX ADMIN — TRAZODONE HYDROCHLORIDE 200 MG: 100 TABLET ORAL at 21:44

## 2019-02-03 RX ADMIN — ACETAMINOPHEN 650 MG: 650 SUPPOSITORY RECTAL at 10:34

## 2019-02-03 RX ADMIN — ACETAMINOPHEN 650 MG: 325 TABLET, FILM COATED ORAL at 21:44

## 2019-02-03 RX ADMIN — LORAZEPAM 0.5 MG: 2 INJECTION INTRAMUSCULAR; INTRAVENOUS at 21:52

## 2019-02-03 RX ADMIN — MORPHINE SULFATE 2 MG: 2 INJECTION, SOLUTION INTRAMUSCULAR; INTRAVENOUS at 12:06

## 2019-02-03 RX ADMIN — MELATONIN TAB 3 MG 3 MG: 3 TAB at 21:44

## 2019-02-03 RX ADMIN — CEFTRIAXONE SODIUM 1 G: 1 INJECTION, POWDER, FOR SOLUTION INTRAMUSCULAR; INTRAVENOUS at 02:47

## 2019-02-03 RX ADMIN — LORAZEPAM 0.5 MG: 2 INJECTION INTRAMUSCULAR; INTRAVENOUS at 12:44

## 2019-02-03 RX ADMIN — MORPHINE SULFATE 2 MG: 2 INJECTION, SOLUTION INTRAMUSCULAR; INTRAVENOUS at 19:10

## 2019-02-03 RX ADMIN — LORAZEPAM 0.5 MG: 2 INJECTION INTRAMUSCULAR; INTRAVENOUS at 16:20

## 2019-02-03 RX ADMIN — MORPHINE SULFATE 2 MG: 2 INJECTION, SOLUTION INTRAMUSCULAR; INTRAVENOUS at 15:11

## 2019-02-03 RX ADMIN — MORPHINE SULFATE 2 MG: 2 INJECTION, SOLUTION INTRAMUSCULAR; INTRAVENOUS at 02:20

## 2019-02-03 RX ADMIN — QUETIAPINE FUMARATE 50 MG: 50 TABLET ORAL at 21:43

## 2019-02-03 RX ADMIN — MORPHINE SULFATE 2 MG: 2 INJECTION, SOLUTION INTRAMUSCULAR; INTRAVENOUS at 04:20

## 2019-02-03 RX ADMIN — LORAZEPAM 0.5 MG: 2 INJECTION INTRAMUSCULAR; INTRAVENOUS at 08:43

## 2019-02-03 ASSESSMENT — ACTIVITIES OF DAILY LIVING (ADL)
ADLS_ACUITY_SCORE: 36

## 2019-02-03 NOTE — PLAN OF CARE
Unable to assess pt's orientation. Per family pt usually talking w/o difficulty at care facility. Unable to communicate with staff today. TMAX 99.6, rectal tylenol given. IV SL. Ativan given x2, morphine given x1. Pt frequently sitting in fetal position. Sitter bedside due to constant fidgeting and movement. WC baseline per family. Skin w/ multiple bruising and tears. Frequent urination, changed 7+ times on day shift. Will continue to monitor.

## 2019-02-03 NOTE — ED PROVIDER NOTES
History     Chief Complaint:  Altered Mental Status    HPI   Capo Jacques is a 88 year old male with history of CAD, aortic stenosis, dementia, hypertension, and hypokalemia, who presents for evaluation of altered mental status. He had a fall three days ago at a skilled care facility, had a scalp laceration and received stitches. Since his fall he has not been acting like himself, and today he has had decreased responsiveness. Care facility reports he has not urinated all day, but on arrival the patient's undergarments are saturated with urine. He is on morphine at his facility, and last dose was nearly 12 hours ago. Blood sugar was 117 for EMS. He became agitated en route and EMS administered narcan with no change in behavior.     Allergies:  Metoprolol Succinate  Septra [Sulfa Drugs]  Doxycycline      Medications:    ACETAMINOPHEN PO  Divalproex Sodium (DEPAKOTE PO)  loperamide (IMODIUM) 2 MG capsule  LORAZEPAM PO  magnesium hydroxide (MOM) 2400 MG/10ML SUSP  MELATONIN PO  polyethylene glycol (MIRALAX/GLYCOLAX) Packet  potassium chloride (KLOR-CON) 20 MEQ Packet  QUETIAPINE FUMARATE PO  TRAZODONE HCL PO     Past Medical History:    Aortic stenosis  Osteoarthrosis  Hypokalemia  Hypertension  CAD (coronary artery disease)   Dementia with annie bodies  DNR/DNI see POLST    Past Surgical History:    Plasty knee, bilateral  Cataract bilateral  Coronary stent percutaneous, initial vessel  Knee scope, meniscectomy  Tonsillectomy/adenopathy  Removal of sperm ducts  Remove pilonidal lesion simple    Family History:    Cerebrovascular disease  Cancer  Diabetes  Heart disease    Social History:  Relationship status:   Tobacco use: Former, quit in 1981.  Alcohol use: Yes  The patient presents via ambulance.    Marital Status:   [2]     Review of Systems   Unable to perform ROS: Dementia     Physical Exam     Patient Vitals for the past 24 hrs:   BP Temp Temp src Pulse Resp SpO2   02/03/19 0038 161/80 97.1   F (36.2  C) Temporal 70 20 96 %        Physical Exam  General: Alert and unable to cooperative with exam. Patient in mild distress. History of dementia, muddering obscenities.   Head:  Scalp is NC/AT  Eyes:  No scleral icterus, PERRL  ENT:  The external nose and ears are normal. The oropharynx is normal and without erythema; mucus membranes are moist. Uvula midline, no evidence of deep space infection.  Neck:  Normal range of motion without rigidity.  CV:  Regular rate and rhythm  Resp:  Breath sounds are clear bilaterally    Non-labored, no retractions or accessory muscle use  GI:  Abdomen is soft, no distension, no tenderness. No peritoneal signs  :  Normal external male exam  MS:  No lower extremity edema   Skin:  Warm and dry, No rash or lesions noted.  Neuro: encephalopathic. No gross motor deficits.    Emergency Department Course     Imaging:  Radiology findings were communicated with the patient's wife who voiced understanding of the findings.  Head CT w/o contrast   Preliminary Result   IMPRESSION: No evidence of acute intracranial abnormality.        Laboratory:  Laboratory findings were communicated with the patient's wife who voiced understanding of the findings.    CBC: RBC: 3.61(L), HGB: 11.5(L), HCT: 34.4(L), PLT: 145(L) o/w WNL (WBC 5.5)  CMP: Creatinine: 0.61(L), Calcium: 8.3(L), Albumin: 2.9(L), Protein total: 6.3(L) o/w WNL      UA with Microscopic: Urine blood: small(A), Protein albumin: 10(A), Leukocyte esterase: large(A), WBC: 105(H), RBC: 7(H), Bacteria: few(A), Mucous: present(A), Hyaline casts: 3(H)  o/w WNL     Urine culture: pending    Interventions:  0220 morphine 2 mg IV  0247 Ceftriaxone 1 g IV    Emergency Department Course:  Nursing notes and vitals reviewed.  IV was inserted and blood was drawn for laboratory testing, results above.   The patient was sent for a CT while in the emergency department, results above.    The patient provided a urine sample here in the emergency  department. This was sent for laboratory testing, findings above.     0054 I performed an exam of the patient as documented above.   0215 I spoke with the patient's wife, and updated her on the patient's condition.   0248 I spoke with Dr. Rosa, of the hospitalist service, who accepted the patient for admission.     I discussed the treatment plan with the patient and his wife. They expressed understanding of this plan and consented to admission. I discussed the patient with Dr. Rosa , who will admit the patient to a monitored bed for further evaluation and treatment.      I personally reviewed the laboratory results with the Patient and spouse and answered all related questions prior to admission.    Impression & Plan      Medical Decision Making:  Patient is a 88-year-old male who presents from group home with increased somnolence/altered mental status over the last 3 days.  Patient's medical history and records were reviewed.  Initial consideration for, not limited to, infectious process, electrolyte abnormality, metabolic disturbance, intercranial bleed/pathology, among others.  Patient did not have any appreciable focal neurologic deficit on exam; head CT (-).  Labs notable for UA demonstrating evidence of infection which is likely cause of altered mentation.  Urine sent for culture.  Patient started on ceftriaxone.  Case was discussed with his wife who is in agreement with IV antibiotic treatment.  He will be admitted to the hospitalist service for further evaluation and care.  Patient remained stable throughout ED course.    Diagnosis:    ICD-10-CM    1. Urinary tract infection without hematuria, site unspecified N39.0    2. Confusion R41.0        Disposition:   Admitted to inpatient bed    Scribe Disclosure:  Judith MADRID, am serving as a scribe at 12:57 AM on 2/3/2019 to document services personally performed by Yonathan Thakur DO, based on my observations and the provider's  statements to me.     Judith Scott  2/3/2019    EMERGENCY DEPARTMENT       Yonathan Thakur DO  02/03/19 0801

## 2019-02-03 NOTE — PLAN OF CARE
PT: order received; Per chart review and conversation with nurse, patient not currently appropriate for evaluation. Nurse requesting patient be checked on tomorrow

## 2019-02-03 NOTE — PROGRESS NOTES
RECEIVING UNIT ED HANDOFF REVIEW    ED Nurse Handoff Report was reviewed by: Brenda Gentile on February 3, 2019 at 4:04 AM

## 2019-02-03 NOTE — ED NOTES
Bed: ED04  Expected date: 2/3/19  Expected time: 12:21 AM  Means of arrival: Ambulance  Comments:  HE 88M Decreased LOC

## 2019-02-03 NOTE — PROVIDER NOTIFICATION
MD Notification    Notified Person: MD    Notified Person Name: Dr. Rosa    Notification Date/Time: 2-3 0830    Notification Interaction: page    Purpose of Notification: pt increasingly anxious. Refusing PO meds. Ativan IV?    Orders Received: PRN ativan IV q 4    Comments:

## 2019-02-03 NOTE — ED NOTES
"[facility]  ED Nurse Handoff Report    ED Chief complaint: Altered Mental Status      ED Diagnosis:   Final diagnoses:   Confusion       Code Status: DNR / DNI; MD to address    Allergies:   Allergies   Allergen Reactions     Metoprolol Succinate Other (See Comments)     Bad dreams,restlessness.     Septra [Sulfa Drugs] GI Disturbance     Doxycycline GI Disturbance     GI upset       Activity level - Baseline/Home:  Unable to Assess, fall 3 days ago.  Resides in assisted living/group home.     Activity Level - Current:   Unable to Assess     Needed?: No    Isolation: No  Infection: Not Applicable  Bariatric?: No    Vital Signs:   Vitals:    02/03/19 0215 02/03/19 0230 02/03/19 0245 02/03/19 0300   BP:  131/65  137/67   Pulse:  60  60   Resp: 19 8 13 11   Temp:       TempSrc:       SpO2: 94% 93% 94% 94%       Cardiac Rhythm: ,   Cardiac  Cardiac Rhythm: Normal sinus rhythm    Pain level:      Is this patient confused?: Yes   Does this patient have a guardian?  No.  Dr Thakur spoke with patient's wife regarding his treatment plan & admission.  Wife is agreeable with plan to admit.          If yes, is there guardianship documents in the Epic \"Code/ACP\" activity?  No         Guardian Notified?  Wife, listed as emergency contact.  Nadeem Jacques.  [phone]  [place] - Suicide Severity Rating Scale Completed?  No, secondary to medical condition.    If yes, what color did the patient score?  N/A, unable to assess at this time due to patient's medical condition.     Patient Report: Initial Complaint:  Confusion  Focused Assessment:  Patient arrives via EMS with confusion since his fall 3 days ago.  Seen at [facility].  Staff reports tonight he seems worse than before with decreased LOC.  Patient is intermittently mumbling, occassional swearing with nursing cares.  Does not become combative or aggressive.   Pulls at sleeve of gown.  Seems to prefer resting in fetal position.  Patient then " settled down & was no longer as restless.  0215 - restless, complaints of pain.  EMS have 0.5 mg of Narcan, patient takes morphine with last dose at 1430.  2 mg morphine given & patient is resting quietly with soft, audible snoring.  VSS.  Has not attempted to climb out of bed.    Incontinence of urine.  UA obtained via straight cath.   Tests Performed:  Labs, CT scan of head  Abnormal Results:    Labs Ordered and Resulted from Time of ED Arrival Up to the Time of Departure from the ED   CBC WITH PLATELETS DIFFERENTIAL - Abnormal; Notable for the following components:       Result Value    RBC Count 3.61 (*)     Hemoglobin 11.5 (*)     Hematocrit 34.4 (*)     Platelet Count 145 (*)     All other components within normal limits   COMPREHENSIVE METABOLIC PANEL - Abnormal; Notable for the following components:    Creatinine 0.61 (*)     Calcium 8.3 (*)     Albumin 2.9 (*)     Protein Total 6.3 (*)     All other components within normal limits   ROUTINE UA WITH MICROSCOPIC - Abnormal; Notable for the following components:    Blood Urine Small (*)     Protein Albumin Urine 10 (*)     Leukocyte Esterase Urine Large (*)     WBC Urine 105 (*)     RBC Urine 7 (*)     Bacteria Urine Few (*)     Mucous Urine Present (*)     Hyaline Casts 3 (*)     All other components within normal limits   PERIPHERAL IV CATHETER   URINE CULTURE AEROBIC BACTERIAL       Treatments provided:  IV insertion.  2 mg morphine.  1 gram ceftriaxone    Family Comments:  Wife called ED regarding patient status.  She was updated by Dr Thakur & she is in agreement with plan of care, aware of patient admission.     OBS brochure/video discussed/provided to patient/family: N/A              Name of person given brochure if not patient:               Relationship to patient:     ED Medications:   Medications   cefTRIAXone (ROCEPHIN) 1 g vial to attach to  mL bag for ADULTS or NS 50 mL bag for PEDS (1 g Intravenous New Bag 2/3/19 6093)   morphine (PF)  injection 2 mg (2 mg Intravenous Given 2/3/19 6390)       Drips infusing?:  No    For the majority of the shift this patient was Green.   Interventions performed were monitor, assess.    Severe Sepsis OR Septic Shock Diagnosis Present: No    To be done/followed up on inpatient unit:  N/A    ED NURSE PHONE NUMBER: *[phone]

## 2019-02-03 NOTE — ED NOTES
Patient resting more quietly at this time.  No longer restless.  Resting quietly & not fidgety with gown or blankets.  Full cardiac monitor.  Vital signs remain stable.

## 2019-02-03 NOTE — PLAN OF CARE
Arrive to floor at 445. Altered mental status, agitated and restless, unable to follow commands, calm environment promoted, sitter at bedside. AVSS on RA. LS diminished.+1/+2 edema BLE. C/o pain during micturation, scant amount of blood at tip of penis. Incontinent of urine. + UTI. Assist x2. Will continue to monitor.

## 2019-02-03 NOTE — H&P
Admitted:     02/03/2019      PRIMARY CARE PROVIDER:  [facility].      CHIEF COMPLAINT:  Altered mental status.      HISTORY:  Capo Jacques is an 88-year-old gentleman who lives in a nursing home with Lewy body dementia who also has history of heart disease, aortic stenosis, dementia, hypertension, amongst other diagnoses.  The patient had a fall 3 days ago and had a scalp laceration for which he was seen in the Emergency Department and received stitches.  Since his fall the patient was not acting like himself apparently.  Today, the patient was less interactive.  Also, it was noted that the patient had not urinated much throughout the day.  He also gets intermittent morphine at the nursing home.  The patient was brought in by EMS.  En route, the patient did receive some Narcan without any improvement of his mentation.  Blood glucose was 117.  The patient was brought to [facility] for further assessment.      In the Emergency Department, the patient was seen by Dr. Joe Thakur.  The patient was afebrile, vital signs were stable.  Blood work revealed normal electrolytes, normal kidney function.  LFTs are also fairly unremarkable with the exception of low albumin of 2.9.  CBC showed a white count of 5.4, hemoglobin 11.5, platelets 145,000.  Glucose 87.  Catheterized urinalysis was grossly abnormal.  He also was incontinent of urine by the time he arrived.  Urinalysis had 105 white cells, 7 red cells.  Urine cultures were obtained.  A head CT without contrast showed no evidence of any acute intracranial abnormality.  The patient was given 2 doses of morphine as well as a gram of ceftriaxone and is being admitted for UTI with acute encephalopathy.      PAST MEDICAL HISTORY:   1.  Lewy body dementia.   2.  Hypertension.   3.  DJD.   4.  ASCVD with history of PCI.   5.  History of paroxysmal atrial fibrillation.   6.  Hypokalemia.   7.  Aortic stenosis.      PAST SURGICAL HISTORY:   1.   Bilateral total knee arthroplasties.   2.  Bilateral cataract surgery.   3.  History of coronary stent placement.   4.  History of meniscectomy.   5.  Tonsillectomy and adenoidectomy.   6.  History of pilonidal cyst removal.      FAMILY HISTORY:  Positive for stroke, cancer, diabetes, heart disease.      SOCIAL HISTORY:  No tobacco, quit in 1981.  Occasional alcohol.  Lives in a nursing home.  He is DNR/DNI.      ALLERGIES:  METOPROLOL SUCCINATE, SEPTRA AND DOXYCYCLINE.      CURRENT MEDICATIONS:   1.  Tylenol 650 mg b.i.d. and 650 mg every 4 hours.   2.  Depakote 250 mg b.i.d. and 125 mg at bedtime.   3.  Artificial tears 2 drops 4 times a day for dry eyes.     4.  Loperamide 2 mg 4 times a day as needed for diarrhea.   5.  Ativan 0.5 mg every 6 hours for agitation and behavior control.   6.  Magnesium hydroxide 5 mL as needed for constipation daily.   7.  Melatonin 3 mg at bedtime.   8.  MiraLax 1 packet daily.   9.  Potassium chloride 40 mEq daily.   10.  Quetiapine 50 mg b.i.d.   11.  Trazodone 200 mg at bedtime and trazodone 50 mg in an additional dose for sleep.      REVIEW OF SYSTEMS:  The patient is altered, unable to participate.      PHYSICAL EXAMINATION:   VITAL SIGNS:  Temperature 98, heart rate 60, respirations 14, blood pressure 170/84, sats 98% on room air.   GENERAL:  The patient is arousable, but confused.   HEENT:  Head is atraumatic.  Pupils are equal.  Sclerae are anicteric.  Head is otherwise unremarkable.  Mucous membranes are moist.   NECK:  Veins are distended.   PULMONARY:  Lungs are clear to auscultation.   CARDIOVASCULAR:  S1, S2, regular rate and rhythm.   ABDOMEN:  Soft, nontender, normoactive bowel sounds.   EXTREMITIES:  No edema.   SKIN:  Warm, dry, well perfused.   NEUROLOGIC:  Unable to really participate, but does move all 4 of his extremities.      LABS/IMAGING STUDIES:  As dictated in the history of present illness.      ASSESSMENT:  Capo Jacques is an 88-year-old who lives in  a nursing home who has Lewy body dementia, history of prior urinary tract infections admitted with acute encephalopathy, urinary tract infection, being admitted for further inpatient treatment.      PLAN:   1.  Acute metabolic encephalopathy in the setting of chronic Lewy body dementia.  This is likely metabolic in nature with his underlying urinary tract infection.  The patient also does get morphine intermittently at the nursing home as well.  Currently, will keep the patient n.p.o. and will give him IV fluids and await clearing of his sensorium.  When he is able to swallow, we will resume the patient on his Depakote for his dementia as well as trazodone and quetiapine.   2.  Urinary tract infection.  The patient has had some urinary retention early in the day.  Urinalysis shows greater than 100 white cells, 7 red cells.  This was a catheterized specimen.  The patient has had urine and blood cultures obtained.  The patient is on IV ceftriaxone.  The patient will receive lactated Ringer's IV infusion.   3.  DJD, chronic pain.  Once patient is more alert, we can give him IV Dilaudid for pain on a p.r.n. basis.  The patient also will have Tylenol for more mild aches and pains.   4.  History of hypokalemia.  We will resume the patient on potassium chloride once he is able to take oral intake.   5.  Deep venous thrombosis prophylaxis.  The patient will have compression boots.      CODE STATUS:  DNR/DNI.         SRINIVAS BOTELLO MD             D: 2019   T: 2019   MT: HUMBERTO      Name:     KATRIN FORTE   MRN:      [id number]        Account:      [id number]   :      1930        Admitted:     2019                   Document: [id number]       cc: [facility]

## 2019-02-03 NOTE — PHARMACY-ADMISSION MEDICATION HISTORY
Admission medication history interview status for the 2/3/2019  admission is complete. See EPIC admission navigator for prior to admission medications     Medication history source reliability:Good    Actions taken by pharmacist (provider contacted, etc):  PTA list obtained through NH MAR ([facility])     Additional medication history information not noted on PTA med list :None    Medication reconciliation/reorder completed by provider prior to medication history? No    Time spent in this activity:  40 minutes    Prior to Admission medications    Medication Sig Last Dose Taking? Auth Provider   acetaminophen (TYLENOL) 500 MG tablet Take 1,000 mg by mouth 3 times daily 2/2/2019 at 2000 Yes Unknown, Entered By History   aspirin (ASA) 81 MG chewable tablet Take 81 mg by mouth daily 2/2/2019 at 0900 Yes Unknown, Entered By History   BUTT PASTE - REGULAR (DR LOVE POOP GOOP BUTT PASTE FORMULA) Apply to red virginie-area 2 times daily and as needed 2/2/2019 at 2000 Yes Unknown, Entered By History   gabapentin (NEURONTIN) 100 MG capsule Take 100 mg by mouth 3 times daily 2/2/2019 at 2000 Yes Unknown, Entered By History   Menthol, Topical Analgesic, (BIOFREEZE EX) Externally apply topically 2 times daily To affected areas on both knees 2/1/2019 at 1400 Yes Unknown, Entered By History   Menthol, Topical Analgesic, (BIOFREEZE) 4 % GEL Externally apply topically 3 times daily 2/2/2019 at 2000 Yes Unknown, Entered By History   morphine 5 MG solu-tab Take 5 mg by mouth 4 times daily Hold medication for sedation. And every 4 hours as needed 2/2/2019 at 2000 Yes Unknown, Entered By History   nystatin (NYSTOP) 472299 UNIT/GM external powder Apply topically 2 times daily as needed PRN Yes Unknown, Entered By History   polyethylene glycol (MIRALAX/GLYCOLAX) Packet Take 1 packet by mouth daily 2/2/2019 at 0900 Yes Reported, Patient   QUEtiapine (SEROQUEL) 100 MG tablet Take 100 mg by mouth daily 2/2/2019 at 1400 Yes  Unknown, Entered By History   QUEtiapine (SEROQUEL) 200 MG tablet Take 200 mg by mouth 2 times daily 2/2/2019 at 2000 Yes Unknown, Entered By History   QUEtiapine (SEROQUEL) 200 MG tablet Take 200 mg by mouth every 3 hours as needed 2/1/2019 at PRN Yes Unknown, Entered By History   senna-docusate (SENOKOT-S/PERICOLACE) 8.6-50 MG tablet Take 1 tablet by mouth 2 times daily 2/2/2019 at 2000 Yes Unknown, Entered By History   terazosin (HYTRIN) 5 MG capsule Take 5 mg by mouth At Bedtime 2/2/2019 at 2000 Yes Unknown, Entered By History   traZODone (DESYREL) 50 MG tablet Take 50 mg by mouth At Bedtime 2/2/2019 at 2000 Yes Unknown, Entered By History   acetaminophen (TYLENOL) 650 MG suppository Place 650 mg rectally every 4 hours as needed for fever PRN  Unknown, Entered By History   alum & mag hydroxide-simethicone (MYLANTA/MAALOX) 200-200-20 MG/5ML SUSP suspension Take 30 mLs by mouth Every 2-3 hours as needed. PRN  Unknown, Entered By History   bisacodyl (DULCOLAX) 10 MG suppository Place 10 mg rectally every other day As needed PRN  Unknown, Entered By History   Dextromethorphan-guaiFENesin  MG/5ML syrup Take 5-10 mLs by mouth every 4 hours as needed for cough To every 6 hours as needed PRN  Unknown, Entered By History   hyoscyamine 0.125 MG TBDP Take 0.125 mg by mouth every 4 hours as needed for cramping PRN  Unknown, Entered By History   hypromellose (ARTIFICIAL TEARS) 0.5 % SOLN ophthalmic solution Place 2 drops into both eyes 4 times daily as needed for dry eyes  PRN  Unknown, Entered By History   loperamide (IMODIUM A-D) 2 MG tablet Take 2 mg by mouth 4 times daily as needed for diarrhea PRN  Unknown, Entered By History   loperamide (IMODIUM A-D) 2 MG tablet Take 4 mg by mouth as needed for diarrhea After first episode of diarrha, then take 1 tablet by mouth after 2nd and/or 3rd episode of diarrhea PRN  Unknown, Entered By History   magnesium hydroxide (MOM) 2400 MG/10ML SUSP Take 5 mLs by mouth daily as  needed for constipation PRN  Reported, Patient   zinc oxide (DESITIN) 40 % external ointment Apply topically 2 times daily as needed for dry skin or irritation To the red virginie-area PRN  Unknown, Entered By History     Yandy Alexander, Pharm.D IV Student

## 2019-02-03 NOTE — ED NOTES
Patient now restless & trying to get out of bed.  Sitter being ordered for patient & repeat dose of 2 mg Morphine.

## 2019-02-03 NOTE — ED NOTES
"Patient not seeming to be able to get comfortable.  Still has mumbling talk that is incoherent & then very understandable when patient is swearing.  Patient is not aggressive or combative; not climbing out of bed.  Is curled up in fetal position which is position he seems to prefer.  Asked patient if he has pain patient states \"yes, pain\".  Warm blankets applied.  Dr Thakur aware & VO received.   "

## 2019-02-03 NOTE — PROGRESS NOTES
Care Transition Initial Assessment - SW  Reason For Consult: discharge planning  Met with: chart review  Active Problems:    UTI (urinary tract infection)       DATA  Lives With: facility resident   Quality of Family Relationships: involved, supportive  Who is your support system?: Children, Wife  Support Assessment: Adequate family and caregiver support.   Identified issues/concerns regarding health management: SW following for d.c needs  Quality of Family Relationships: involved, supportive     ASSESSMENT  Cognitive Status: ТАТЬЯНА   Concerns to be addressed: Patient is a 88 year old male who was admitted to the hospital for UTI. Prior to hospitalization patient was living at [facility] (memory care). SW spoke with [facility] staff who stated that patient receives all [facility] care (bathing, toileting, dressing, meals, meds). If patient d.c's back to [facility], New meds will need to be sent to [facility] pharmacy. SW will need to update RN at [facility] at [phone]. PT is to assess patient and will make recommendations based on assessment. SW will follow up after assessment.     PLAN  Financial costs for the patient includes   Patient given options and choices for discharge   Patient/family is agreeable to the plan?    Patient Goals and Preferences: TBD  Patient anticipates discharging to:  [facility]   *[id number]

## 2019-02-03 NOTE — PROVIDER NOTIFICATION
MD Notification    Notified Person: MD    Notified Person Name: Dr. Rosa    Notification Date/Time: 2/3 1012    Notification Interaction: page    Purpose of Notification: Refusing PO meds, TMAX 99.8, want to give rectal tylenol    Orders Received:    Comments:

## 2019-02-04 LAB
ANION GAP SERPL CALCULATED.3IONS-SCNC: 3 MMOL/L (ref 3–14)
BACTERIA SPEC CULT: ABNORMAL
BUN SERPL-MCNC: 12 MG/DL (ref 7–30)
CALCIUM SERPL-MCNC: 8.3 MG/DL (ref 8.5–10.1)
CHLORIDE SERPL-SCNC: 108 MMOL/L (ref 94–109)
CO2 SERPL-SCNC: 29 MMOL/L (ref 20–32)
CREAT SERPL-MCNC: 0.54 MG/DL (ref 0.66–1.25)
ERYTHROCYTE [DISTWIDTH] IN BLOOD BY AUTOMATED COUNT: 13.2 % (ref 10–15)
GFR SERPL CREATININE-BSD FRML MDRD: >90 ML/MIN/{1.73_M2}
GLUCOSE SERPL-MCNC: 86 MG/DL (ref 70–99)
HCT VFR BLD AUTO: 33.9 % (ref 40–53)
HGB BLD-MCNC: 11.4 G/DL (ref 13.3–17.7)
Lab: ABNORMAL
MCH RBC QN AUTO: 31.8 PG (ref 26.5–33)
MCHC RBC AUTO-ENTMCNC: 33.6 G/DL (ref 31.5–36.5)
MCV RBC AUTO: 95 FL (ref 78–100)
PLATELET # BLD AUTO: 135 10E9/L (ref 150–450)
POTASSIUM SERPL-SCNC: 3.6 MMOL/L (ref 3.4–5.3)
RBC # BLD AUTO: 3.58 10E12/L (ref 4.4–5.9)
SODIUM SERPL-SCNC: 140 MMOL/L (ref 133–144)
SPECIMEN SOURCE: ABNORMAL
WBC # BLD AUTO: 3.8 10E9/L (ref 4–11)

## 2019-02-04 PROCEDURE — A9270 NON-COVERED ITEM OR SERVICE: HCPCS | Mod: GY | Performed by: INTERNAL MEDICINE

## 2019-02-04 PROCEDURE — 12000000 ZZH R&B MED SURG/OB

## 2019-02-04 PROCEDURE — 99233 SBSQ HOSP IP/OBS HIGH 50: CPT | Performed by: INTERNAL MEDICINE

## 2019-02-04 PROCEDURE — 85027 COMPLETE CBC AUTOMATED: CPT | Performed by: INTERNAL MEDICINE

## 2019-02-04 PROCEDURE — 80048 BASIC METABOLIC PNL TOTAL CA: CPT | Performed by: INTERNAL MEDICINE

## 2019-02-04 PROCEDURE — 36415 COLL VENOUS BLD VENIPUNCTURE: CPT | Performed by: INTERNAL MEDICINE

## 2019-02-04 PROCEDURE — 25000128 H RX IP 250 OP 636: Performed by: INTERNAL MEDICINE

## 2019-02-04 PROCEDURE — 25000132 ZZH RX MED GY IP 250 OP 250 PS 637: Mod: GY | Performed by: INTERNAL MEDICINE

## 2019-02-04 RX ORDER — QUETIAPINE FUMARATE 50 MG/1
100 TABLET, FILM COATED ORAL 2 TIMES DAILY
Status: DISCONTINUED | OUTPATIENT
Start: 2019-02-04 | End: 2019-02-05

## 2019-02-04 RX ORDER — OLANZAPINE 10 MG/2ML
5-10 INJECTION, POWDER, FOR SOLUTION INTRAMUSCULAR EVERY 6 HOURS PRN
Status: DISCONTINUED | OUTPATIENT
Start: 2019-02-04 | End: 2019-02-08 | Stop reason: HOSPADM

## 2019-02-04 RX ORDER — QUETIAPINE FUMARATE 50 MG/1
50 TABLET, FILM COATED ORAL 3 TIMES DAILY PRN
Status: DISCONTINUED | OUTPATIENT
Start: 2019-02-04 | End: 2019-02-06

## 2019-02-04 RX ADMIN — OLANZAPINE 5 MG: 10 INJECTION, POWDER, FOR SOLUTION INTRAMUSCULAR at 14:01

## 2019-02-04 RX ADMIN — QUETIAPINE FUMARATE 100 MG: 50 TABLET ORAL at 20:22

## 2019-02-04 RX ADMIN — DIVALPROEX SODIUM 250 MG: 250 TABLET, DELAYED RELEASE ORAL at 10:02

## 2019-02-04 RX ADMIN — MELATONIN TAB 3 MG 3 MG: 3 TAB at 20:27

## 2019-02-04 RX ADMIN — OLANZAPINE 5 MG: 10 INJECTION, POWDER, FOR SOLUTION INTRAMUSCULAR at 14:52

## 2019-02-04 RX ADMIN — QUETIAPINE FUMARATE 50 MG: 50 TABLET ORAL at 10:02

## 2019-02-04 RX ADMIN — MORPHINE SULFATE 2 MG: 2 INJECTION, SOLUTION INTRAMUSCULAR; INTRAVENOUS at 01:06

## 2019-02-04 RX ADMIN — CEFTRIAXONE SODIUM 1 G: 1 INJECTION, POWDER, FOR SOLUTION INTRAMUSCULAR; INTRAVENOUS at 02:18

## 2019-02-04 RX ADMIN — ACETAMINOPHEN 650 MG: 325 TABLET, FILM COATED ORAL at 20:22

## 2019-02-04 RX ADMIN — ACETAMINOPHEN 650 MG: 325 TABLET, FILM COATED ORAL at 10:02

## 2019-02-04 RX ADMIN — OLANZAPINE 10 MG: 10 INJECTION, POWDER, FOR SOLUTION INTRAMUSCULAR at 19:42

## 2019-02-04 RX ADMIN — LORAZEPAM 0.5 MG: 2 INJECTION INTRAMUSCULAR; INTRAVENOUS at 03:08

## 2019-02-04 RX ADMIN — POTASSIUM CHLORIDE 40 MEQ: 1.5 POWDER, FOR SOLUTION ORAL at 10:04

## 2019-02-04 RX ADMIN — TRAZODONE HYDROCHLORIDE 200 MG: 100 TABLET ORAL at 20:27

## 2019-02-04 ASSESSMENT — ACTIVITIES OF DAILY LIVING (ADL)
ADLS_ACUITY_SCORE: 36
ADLS_ACUITY_SCORE: 21
ADLS_ACUITY_SCORE: 21
ADLS_ACUITY_SCORE: 36
ADLS_ACUITY_SCORE: 21
ADLS_ACUITY_SCORE: 36

## 2019-02-04 NOTE — PLAN OF CARE
PT: Per discussion with RN, patient remains confused and agitated. Will hold PT evaluation until patient can follow cues to actively participate in PT.

## 2019-02-04 NOTE — PLAN OF CARE
Confused and restless at times, unable to follow commands, ativan given x1, calm environment promoted, sitter at bedside. Frequently in fetal position. Incontinent of urine x1. Assist of 2. Mechanical soft, honey thick liquids. Meds crushed in applesauce. Abx to start tonight. Will continue to monitor.

## 2019-02-04 NOTE — PROGRESS NOTES
[facility]    Medicine Progress Note - Hospitalist Service       Date of Admission:  2/3/2019  Date of Service: 2/4/2019    Assessment & Plan   88-year-old who lives in a nursing home who has Lewy body dementia, history of prior urinary tract infections admitted with acute encephalopathy, urinary tract infection, being admitted for further inpatient treatment.      1.  Acute metabolic encephalopathy in the setting of chronic Lewy body dementia.    - Reports this AM of continued confused and agitated behavior  - UTI may be a contributing factor  - I am not sure why but this patient has been receiving IV ativan and morphine  - I understand he gets morphine PTA 5mg PO QID, currently he has 1-2mg IV q6 PRN, keep this the same today  - I have taken all ativan out of his med list  - not on depakote so I discontinued this  - continue on trazodone per PTA regimen, consider cutting dose of trazodone if not improving  - he is pretty agitated today, not getting close to his PTA regimen of seroquel, will gradually increase the dose today to 100mg BID and 50mg TID PRN, his PTA dose is 200mg BID and 200mg q3 PRN  - continue attempts to work with PT/OT but not likely to be able to work with them due to advanced dementia  - added IV/IM Zyprexa due to lack of cooperation    2.  Urinary tract infection.    - given recent worsening of mental status its reasonable to treat, has received two dose of IV rocephin so far  - UC growing >100k E coli  - keep on IV Rocephin until abx sensitivity back    3.  DJD, chronic pain.    - Holding PTA Morphine due to encephalopathy  - has PRN IV morphine at lower dose  - tylenol PRN     4.  History of hypokalemia.    - Resumed on PTA Klor con  - follow BMP      Diet: Combination Diet Mechanical/Dental Soft Diet; Tyramine Controlled Diet  Room Service    DVT Prophylaxis: Pneumatic Compression Devices  Ordaz Catheter: not present  Code Status: DNR/DNI        Diet: Combination Diet  Mechanical/Dental Soft Diet; Tyramine Controlled Diet  Room Service    DVT Prophylaxis: Pneumatic Compression Devices  Ordaz Catheter: not present  Code Status: DNR/DNI      Disposition Plan   Expected discharge: 2 - 3 days, recommended to prior living arrangement vs TCU once antibiotic plan established, mental status at baseline and safe disposition plan/ TCU bed available.  Entered: Boris Desouza MD 02/04/2019, 1:03 PM       The patient's care was discussed with the Bedside Nurse.    Boris Desouza MD  Hospitalist Service  [facility]    ______________________________________________________________________    Interval History   Still very confused and agitated, would go to sleep when given ativan.  He is trying to put his pillow on his foot, completely unable to orient or interview, he is re-directable and distractible.  Bedside RN reports he does try to get out of bed on occasion.    Data reviewed today: I reviewed all medications, new labs and imaging results over the last 24 hours. I personally reviewed no images or EKG's today.    Physical Exam   Vital Signs: Temp: 95.4  F (35.2  C) Temp src: Axillary BP: 124/48   Heart Rate: 58 Resp: 18 SpO2: 94 % O2 Device: None (Room air)    Weight: 161 lbs 12.8 oz  Constitutional: awake, alert, uncooperative, distracted and mild distress  Respiratory: No increased work of breathing, good air exchange, clear to auscultation bilaterally, no crackles or wheezing  Cardiovascular: Normal apical impulse, regular rate and rhythm, normal S1 and S2, no S3 or S4, and no murmur noted  GI: No scars, normal bowel sounds, soft, non-distended, non-tender, no masses palpated, no hepatosplenomegally  Skin: no bruising or bleeding, no redness, warmth, or swelling, no jaundice and pale and cool to touch.  Musculoskeletal: no lower extremity pitting edema present  full range of motion noted  tone is normal    Data   Recent Labs   Lab 02/04/19  0701 02/03/19  0045    WBC 3.8* 5.5   HGB 11.4* 11.5*   MCV 95 95   * 145*    142   POTASSIUM 3.6 3.7   CHLORIDE 108 108   CO2 29 29   BUN 12 12   CR 0.54* 0.61*   ANIONGAP 3 5   PARTH 8.3* 8.3*   GLC 86 87   ALBUMIN  --  2.9*   PROTTOTAL  --  6.3*   BILITOTAL  --  0.7   ALKPHOS  --  59   ALT  --  19   AST  --  29     No results found for this or any previous visit (from the past 24 hour(s)).

## 2019-02-04 NOTE — PLAN OF CARE
Patient is confused and disoriented x4, does not follow commands and does not respond to questions. VSS. No signs or symptoms of pain other than agitation, gave IV morphine x1 and IV ativan x1. Various bruises and scabs throughout. Mechanical soft diet with honey thickened liquids, minimal intake. R PIV SL. Bedrest but repositions self frequently. Incontinent of bladder.

## 2019-02-05 LAB
ANION GAP SERPL CALCULATED.3IONS-SCNC: 4 MMOL/L (ref 3–14)
BUN SERPL-MCNC: 12 MG/DL (ref 7–30)
CALCIUM SERPL-MCNC: 8.9 MG/DL (ref 8.5–10.1)
CHLORIDE SERPL-SCNC: 111 MMOL/L (ref 94–109)
CO2 SERPL-SCNC: 29 MMOL/L (ref 20–32)
CREAT SERPL-MCNC: 0.56 MG/DL (ref 0.66–1.25)
GFR SERPL CREATININE-BSD FRML MDRD: >90 ML/MIN/{1.73_M2}
GLUCOSE SERPL-MCNC: 80 MG/DL (ref 70–99)
POTASSIUM SERPL-SCNC: 3.8 MMOL/L (ref 3.4–5.3)
SODIUM SERPL-SCNC: 144 MMOL/L (ref 133–144)

## 2019-02-05 PROCEDURE — 25000132 ZZH RX MED GY IP 250 OP 250 PS 637: Mod: GY | Performed by: INTERNAL MEDICINE

## 2019-02-05 PROCEDURE — 12000000 ZZH R&B MED SURG/OB

## 2019-02-05 PROCEDURE — 99233 SBSQ HOSP IP/OBS HIGH 50: CPT | Performed by: INTERNAL MEDICINE

## 2019-02-05 PROCEDURE — 25000128 H RX IP 250 OP 636: Performed by: INTERNAL MEDICINE

## 2019-02-05 PROCEDURE — A9270 NON-COVERED ITEM OR SERVICE: HCPCS | Mod: GY | Performed by: INTERNAL MEDICINE

## 2019-02-05 PROCEDURE — 36415 COLL VENOUS BLD VENIPUNCTURE: CPT | Performed by: INTERNAL MEDICINE

## 2019-02-05 PROCEDURE — 80048 BASIC METABOLIC PNL TOTAL CA: CPT | Performed by: INTERNAL MEDICINE

## 2019-02-05 RX ORDER — SULFAMETHOXAZOLE/TRIMETHOPRIM 800-160 MG
1 TABLET ORAL 2 TIMES DAILY
Status: DISCONTINUED | OUTPATIENT
Start: 2019-02-05 | End: 2019-02-05

## 2019-02-05 RX ORDER — QUETIAPINE FUMARATE 200 MG/1
200 TABLET, FILM COATED ORAL 2 TIMES DAILY
Status: DISCONTINUED | OUTPATIENT
Start: 2019-02-05 | End: 2019-02-06

## 2019-02-05 RX ORDER — CEFTRIAXONE 1 G/1
1 INJECTION, POWDER, FOR SOLUTION INTRAMUSCULAR; INTRAVENOUS EVERY 24 HOURS
Status: DISCONTINUED | OUTPATIENT
Start: 2019-02-06 | End: 2019-02-06

## 2019-02-05 RX ADMIN — QUETIAPINE FUMARATE 100 MG: 50 TABLET ORAL at 08:24

## 2019-02-05 RX ADMIN — ACETAMINOPHEN 650 MG: 325 TABLET, FILM COATED ORAL at 21:57

## 2019-02-05 RX ADMIN — CEFTRIAXONE SODIUM 1 G: 1 INJECTION, POWDER, FOR SOLUTION INTRAMUSCULAR; INTRAVENOUS at 02:20

## 2019-02-05 RX ADMIN — TRAZODONE HYDROCHLORIDE 200 MG: 100 TABLET ORAL at 21:56

## 2019-02-05 RX ADMIN — QUETIAPINE FUMARATE 200 MG: 200 TABLET ORAL at 21:57

## 2019-02-05 RX ADMIN — OLANZAPINE 10 MG: 10 INJECTION, POWDER, FOR SOLUTION INTRAMUSCULAR at 16:16

## 2019-02-05 RX ADMIN — ACETAMINOPHEN 650 MG: 325 TABLET, FILM COATED ORAL at 08:24

## 2019-02-05 RX ADMIN — POTASSIUM CHLORIDE 40 MEQ: 1.5 POWDER, FOR SOLUTION ORAL at 08:24

## 2019-02-05 RX ADMIN — POLYETHYLENE GLYCOL 3350 17 G: 17 POWDER, FOR SOLUTION ORAL at 08:24

## 2019-02-05 RX ADMIN — MORPHINE SULFATE 2 MG: 2 INJECTION, SOLUTION INTRAMUSCULAR; INTRAVENOUS at 04:22

## 2019-02-05 RX ADMIN — MORPHINE SULFATE 2 MG: 2 INJECTION, SOLUTION INTRAMUSCULAR; INTRAVENOUS at 22:04

## 2019-02-05 RX ADMIN — MELATONIN TAB 3 MG 3 MG: 3 TAB at 21:56

## 2019-02-05 ASSESSMENT — ACTIVITIES OF DAILY LIVING (ADL)
ADLS_ACUITY_SCORE: 38
ADLS_ACUITY_SCORE: 36
ADLS_ACUITY_SCORE: 38
ADLS_ACUITY_SCORE: 36
ADLS_ACUITY_SCORE: 21
ADLS_ACUITY_SCORE: 36

## 2019-02-05 NOTE — PROGRESS NOTES
[facility]    Medicine Progress Note - Hospitalist Service       Date of Admission:  2/3/2019  Date of Service: 2/5/2019    Assessment & Plan   88-year-old who lives in a nursing home who has Lewy body dementia, history of prior urinary tract infections admitted with acute encephalopathy, urinary tract infection, being admitted for further inpatient treatment.      1.  Acute metabolic encephalopathy in the setting of chronic Lewy body dementia.    - Remains confused, agitated, uncooperative.  - I spoke to his daughter, he gets this way when he is out of his comfort zone  - UTI may be a contributing factor, however, suspect this is decompensation of his Lewy Body Dementia  - I am not sure why but this patient had been receiving IV ativan on admission but I stopped it 48 hours ago, still pretty delerious  - I understand he gets morphine PTA 5mg PO QID, currently he has 1-2mg IV q6 PRN, keep this the same today  - not on depakote so I discontinued this  - continue on trazodone per PTA regimen, consider cutting dose of trazodone if not improving  - his PTA dose of seroquel is 200mg BID and 200mg q3 PRN, I put him back on 100mg BID and 50mg PRN, thought maybe his PTA dose was causing AMS, he is still pretty agitated despite the increase, will increase back to PTA dose, keep PRN the same but consider going up to 100mg if the increase of the scheduled doesn't help  - I do not think he will be able to work with PT/OT  - added IV/IM Zyprexa due to lack of cooperation  - After talking to his daughter I think the best thing for him is to get back to NH    2.  Urinary tract infection.    - given recent worsening of mental status its reasonable to treat, has received two dose of IV rocephin so far  - UC growing >100k E coli, the sensitivity shows resistance to fluoroquinolones, I would like to use bactrim as it appears to be only good PO option but he is allergic  - will give him one more dose of IV Ceftriaxone  in the AM before going back to NH    3.  DJD, chronic pain.    - Holding PTA Morphine due to encephalopathy  - has PRN IV morphine at lower dose  - tylenol PRN     4.  History of hypokalemia.    - Resumed on PTA Klor con  - follow BMP      Diet: Combination Diet Mechanical/Dental Soft Diet; Tyramine Controlled Diet  Room Service    DVT Prophylaxis: Pneumatic Compression Devices  Ordaz Catheter: not present  Code Status: DNR/DNI        Diet: Combination Diet Mechanical/Dental Soft Diet; Tyramine Controlled Diet  Room Service    DVT Prophylaxis: Pneumatic Compression Devices  Ordaz Catheter: not present  Code Status: DNR/DNI      Disposition Plan   Expected discharge: Hoepfully tomorrow back to NH, he needs one more dose of IV ceftriaxone in the early AM and IV is only option so he will need to stay one more night.  Entered: Boris Desouza MD 02/05/2019, 3:32 PM       The patient's care was discussed with the Bedside Nurse and Patients Daughter.    Boris Desouza MD  Hospitalist Service  [facility]    ______________________________________________________________________    Interval History   Still very confused and agitated, would go to sleep when given ativan.  He is trying to put his pillow on his foot, completely unable to orient or interview, he is re-directable and distractible.  Bedside RN reports he does try to get out of bed on occasion.    Data reviewed today: I reviewed all medications, new labs and imaging results over the last 24 hours. I personally reviewed no images or EKG's today.    Physical Exam   Vital Signs: Temp: 96.2  F (35.7  C) Temp src: Axillary BP: 130/79   Heart Rate: 80 Resp: 19 SpO2: 98 % O2 Device: None (Room air)    Weight: 161 lbs 12.8 oz  Constitutional: awake, agitated, uncooperative, distracted and mild distress  Respiratory: No increased work of breathing, good air exchange, clear to auscultation bilaterally, no crackles or wheezing  Cardiovascular: Normal  apical impulse, regular rate and rhythm, normal S1 and S2, no S3 or S4, and no murmur noted  GI: No scars, normal bowel sounds, soft, non-distended, non-tender, no masses palpated, no hepatosplenomegally  Skin: no bruising or bleeding, no redness, warmth, or swelling, no jaundice and pale and cool to touch.  Musculoskeletal: no lower extremity pitting edema present  full range of motion noted  tone is normal    Data   Recent Labs   Lab 02/05/19  0716 02/04/19  0701 02/03/19  0045   WBC  --  3.8* 5.5   HGB  --  11.4* 11.5*   MCV  --  95 95   PLT  --  135* 145*    140 142   POTASSIUM 3.8 3.6 3.7   CHLORIDE 111* 108 108   CO2 29 29 29   BUN 12 12 12   CR 0.56* 0.54* 0.61*   ANIONGAP 4 3 5   PARTH 8.9 8.3* 8.3*   GLC 80 86 87   ALBUMIN  --   --  2.9*   PROTTOTAL  --   --  6.3*   BILITOTAL  --   --  0.7   ALKPHOS  --   --  59   ALT  --   --  19   AST  --   --  29     No results found for this or any previous visit (from the past 24 hour(s)).

## 2019-02-05 NOTE — PLAN OF CARE
Discharge Planner PT   Patient plan for discharge: Unable to state, as patient agitated and confused.   Current status: Patient agitated and confused per chart. Noted Dr. Desouza's note stating plan is to hopefully get patient back to NH tomorrow and nurse stated patient not appropriate for PT.   Barriers to return to prior living situation: None to return to NH  Recommendations for discharge: Back to NH  Rationale for recommendations: Per chart and nurse, patient not appropriate for therapy.        Entered by: Fela Lea 02/05/2019 5:00 PM

## 2019-02-05 NOTE — PLAN OF CARE
Oriented to self only.  Sitter at bedside.  Pt restless but improved with IM zyprexa.   Incontient of Poor appetite today, but tolerated pills with applesauce well.  Nursing will continue to monitor.

## 2019-02-05 NOTE — PLAN OF CARE
Patient is confused and unable to assess orientation. VSS. Gave morphine x1 for signs of mild pain. Bruises and scabs throughout. On mechanical soft with honey thickened liquids. R PIV SL. Bedrest. Turned and repositioned as tolerated. Incontinent of bowel and bladder.

## 2019-02-06 PROBLEM — G31.83 LEWY BODY DEMENTIA (H): Status: ACTIVE | Noted: 2019-02-06

## 2019-02-06 PROBLEM — F02.80 LEWY BODY DEMENTIA (H): Status: ACTIVE | Noted: 2019-02-06

## 2019-02-06 PROBLEM — G92.8 TOXIC METABOLIC ENCEPHALOPATHY: Status: ACTIVE | Noted: 2019-02-06

## 2019-02-06 PROCEDURE — A9270 NON-COVERED ITEM OR SERVICE: HCPCS | Mod: GY | Performed by: INTERNAL MEDICINE

## 2019-02-06 PROCEDURE — 12000000 ZZH R&B MED SURG/OB

## 2019-02-06 PROCEDURE — 25000132 ZZH RX MED GY IP 250 OP 250 PS 637: Mod: GY | Performed by: INTERNAL MEDICINE

## 2019-02-06 PROCEDURE — 99233 SBSQ HOSP IP/OBS HIGH 50: CPT | Performed by: INTERNAL MEDICINE

## 2019-02-06 PROCEDURE — 25000128 H RX IP 250 OP 636: Performed by: INTERNAL MEDICINE

## 2019-02-06 PROCEDURE — 99207 ZZC CDG-MDM COMPONENT: MEETS MODERATE - UP CODED: CPT | Performed by: INTERNAL MEDICINE

## 2019-02-06 RX ADMIN — ACETAMINOPHEN 650 MG: 325 TABLET, FILM COATED ORAL at 08:09

## 2019-02-06 RX ADMIN — QUETIAPINE FUMARATE 200 MG: 200 TABLET ORAL at 08:06

## 2019-02-06 RX ADMIN — POTASSIUM CHLORIDE 40 MEQ: 1.5 POWDER, FOR SOLUTION ORAL at 08:09

## 2019-02-06 RX ADMIN — TRAZODONE HYDROCHLORIDE 200 MG: 100 TABLET ORAL at 20:42

## 2019-02-06 RX ADMIN — MELATONIN TAB 3 MG 3 MG: 3 TAB at 20:42

## 2019-02-06 RX ADMIN — CEFTRIAXONE SODIUM 1 G: 1 INJECTION, POWDER, FOR SOLUTION INTRAMUSCULAR; INTRAVENOUS at 02:03

## 2019-02-06 RX ADMIN — Medication 25 MG: at 12:06

## 2019-02-06 RX ADMIN — CEPHALEXIN 500 MG: 250 CAPSULE ORAL at 20:41

## 2019-02-06 RX ADMIN — ACETAMINOPHEN 650 MG: 325 TABLET, FILM COATED ORAL at 20:41

## 2019-02-06 ASSESSMENT — ACTIVITIES OF DAILY LIVING (ADL)
ADLS_ACUITY_SCORE: 36
ADLS_ACUITY_SCORE: 35
ADLS_ACUITY_SCORE: 36

## 2019-02-06 NOTE — DISCHARGE SUMMARY
[facility]    Discharge Summary  Hospitalist    Date of Admission:  2/3/2019  Date of Discharge:  2/8/2019  Discharging Provider: Fercho Marquez MD    Discharge Diagnoses   Principal Problem:    Toxic metabolic encephalopathy -- related to E Coli UTI and underlying Lewy Body Dementia with behaviors    Active Problems:    Essential hypertension, benign    CAD (coronary artery disease)    UTI (urinary tract infection) -- E. Coli UTI, pan-sensistive    Lewy body dementia -- with behaviors      History of Present Illness   88-year-old gentleman who lives in a nursing home with Lewy body dementia who also has history of heart disease, aortic stenosis, dementia, hypertension, amongst other diagnoses.  The patient had a fall 3 days ago and had a scalp laceration for which he was seen in the Emergency Department and received stitches.  Since his fall the patient was not acting like himself apparently.  Today, the patient was less interactive.  Also, it was noted that the patient had not urinated much throughout the day.  He also gets intermittent morphine at the nursing home.  The patient was brought in by EMS.  En route, the patient did receive some Narcan without any improvement of his mentation.  Blood glucose was 117.  The patient was brought to [facility] for further assessment.      In the Emergency Department, the patient was seen by Dr. Joe Thakur.  The patient was afebrile, vital signs were stable.  Blood work revealed normal electrolytes, normal kidney function.  LFTs are also fairly unremarkable with the exception of low albumin of 2.9.  CBC showed a white count of 5.4, hemoglobin 11.5, platelets 145,000.  Glucose 87.  Catheterized urinalysis was grossly abnormal.  He also was incontinent of urine by the time he arrived.  Urinalysis had 105 white cells, 7 red cells.  Urine cultures were obtained.  A head CT without contrast showed no evidence of any acute intracranial  abnormality.  The patient was given 2 doses of morphine as well as a gram of ceftriaxone and is being admitted for UTI with acute encephalopathy.          Hospital Course   Admitted to medicine floor, treated with IV ceftriaxone for UTI, and urine culture grew 100K E. Coli which was pan-sensitive, and will treat with Keflex 500 mg bid for an additional 5 days (8 days total) and check UA and UC at nursing home in 10 days to be sure it has cleared.      His Encephalopathy and agitation was probable related to toxic metabolic condition relate to his UTI and underlying lewy body dementia, and possibly worsened by the morphine he was getting PRN.  Was initially requiring high doses of Seroquel (200 mg bid and prn) to manage agitation, but were able to change to trazodone 25 mg every 4 hours as needed and then 200 mg at bedtime and at discharge he was sleeping better at night, and awake enough to eat during the day.  Sodium 148 on discharge, will encourage PO fluids.     Discussed long term options with wife and daughter, he was on hospice one year ago but then stabilized and back to DNR/DNI.  His wife did not want hospice at this time, but given his advance dementia, if ongoing behavior issues one could consider hospice as an option again.     Fercho Marquez MD  Pager: [phone]  Cell Phone:  [phone]       Significant Results and Procedures   As above    Pending Results   These results will be followed up by Dr. Marquez  Unresulted Labs Ordered in the Past 30 Days of this Admission     No orders found from 12/5/2018 to 2/4/2019.          Code Status   DNR / DNI       Primary Care Physician   [facility]    Physical Exam   Temp: 96.7  F (35.9  C) Temp src: Axillary BP: 145/61   Heart Rate: 54 Resp: 18 SpO2: 96 % O2 Device: None (Room air)    Vitals:    02/03/19 0449   Weight: 73.4 kg (161 lb 12.8 oz)     Vital Signs with Ranges  Temp:  [96.3  F (35.7  C)-96.7  F (35.9  C)] 96.7  F (35.9   C)  Heart Rate:  [54-94] 54  Resp:  [18] 18  BP: (135-156)/(61-78) 145/61  SpO2:  [93 %-96 %] 96 %  No intake/output data recorded.    Exam on discharge:   Awake but confused, Ox1    # Discharge Pain Plan:   - Patient currently has NO PAIN and is not being prescribed pain medications on discharge.      Discharge Disposition   Discharged to nursing home  Condition at discharge: Fair    Consultations This Hospital Stay   PHYSICAL THERAPY ADULT IP CONSULT    Time Spent on this Encounter   I spent a total of 35 minutes discharging this patient.     Discharge Orders      General info for SNF    Length of Stay Estimate: Long Term Care  Condition at Discharge: Stable  Level of care:skilled   Rehabilitation Potential: Poor  Admission H&P remains valid and up-to-date: Yes  Recent Chemotherapy: N/A  Use Nursing Home Standing Orders: Yes     Mantoux instructions    Give two-step Mantoux (PPD) Per Facility Policy No (if no explain), just there     Reason for your hospital stay    Acute confusion related to UTI and underlying Lewy Body Dementia with behaviors.     Additional Discharge Instructions    Call Dr. Rivera at Pager [phone] if questions, or Cell Phone [phone].     Follow Up and recommended labs and tests    Follow up with Nursing home physician.  In 10 days, would check UA and UC then, and check BMP then.     Activity - Up with nursing assistance     DNR/DNI     Advance Diet as Tolerated    Follow this diet upon discharge: Orders Placed This Encounter      Room Service      Combination Diet Mechanical/Dental Soft Diet; Tyramine Controlled Diet, encourage oral fluid intake.     Discharge Medications   Current Discharge Medication List      START taking these medications    Details   cephALEXin (KEFLEX) 500 MG capsule Take 1 capsule (500 mg) by mouth 2 times daily for 5 days  Qty: 10 capsule, Refills: 0    Associated Diagnoses: Urinary tract infection without hematuria, site unspecified         CONTINUE these  medications which have CHANGED    Details   acetaminophen (TYLENOL) 325 MG tablet Take 2 tablets (650 mg) by mouth every 4 hours as needed for mild pain    Associated Diagnoses: Pain      loperamide (IMODIUM) 2 MG capsule Take 1 capsule (2 mg) by mouth 4 times daily as needed for diarrhea  Qty: 10 capsule, Refills: 0    Associated Diagnoses: Diarrhea, unspecified type      melatonin 3 MG CAPS Take 3 mg by mouth At Bedtime    Associated Diagnoses: Lewy body dementia with behavioral disturbance      potassium chloride (KLOR-CON) 20 MEQ packet Take 40 mEq by mouth daily  Qty: 60 packet, Refills: 0    Associated Diagnoses: Hypokalemia      !! traZODone (DESYREL) 100 MG tablet Take 2 tablets (200 mg) by mouth At Bedtime  Qty: 60 tablet, Refills: 0    Associated Diagnoses: Lewy body dementia with behavioral disturbance      !! traZODone (DESYREL) 50 MG tablet Take 0.5 tablets (25 mg) by mouth every 4 hours as needed for other (Agitation)    Associated Diagnoses: Lewy body dementia with behavioral disturbance       !! - Potential duplicate medications found. Please discuss with provider.      CONTINUE these medications which have NOT CHANGED    Details   aspirin (ASA) 81 MG chewable tablet Take 81 mg by mouth daily      BUTT PASTE - REGULAR (DR LOVE POOP GOOP BUTT PASTE FORMULA) Apply to red virginie-area 2 times daily and as needed      !! Menthol, Topical Analgesic, (BIOFREEZE EX) Externally apply topically 2 times daily To affected areas on both knees      !! Menthol, Topical Analgesic, (BIOFREEZE) 4 % GEL Externally apply topically 3 times daily      nystatin (NYSTOP) 903111 UNIT/GM external powder Apply topically 2 times daily as needed      polyethylene glycol (MIRALAX/GLYCOLAX) Packet Take 1 packet by mouth daily      senna-docusate (SENOKOT-S/PERICOLACE) 8.6-50 MG tablet Take 1 tablet by mouth 2 times daily      terazosin (HYTRIN) 5 MG capsule Take 5 mg by mouth At Bedtime      alum & mag hydroxide-simethicone  (MYLANTA/MAALOX) 200-200-20 MG/5ML SUSP suspension Take 30 mLs by mouth Every 2-3 hours as needed.      bisacodyl (DULCOLAX) 10 MG suppository Place 10 mg rectally every other day As needed      Dextromethorphan-guaiFENesin  MG/5ML syrup Take 5-10 mLs by mouth every 4 hours as needed for cough To every 6 hours as needed      hypromellose (ARTIFICIAL TEARS) 0.5 % SOLN ophthalmic solution Place 2 drops into both eyes 4 times daily as needed for dry eyes       magnesium hydroxide (MOM) 2400 MG/10ML SUSP Take 5 mLs by mouth daily as needed for constipation      zinc oxide (DESITIN) 40 % external ointment Apply topically 2 times daily as needed for dry skin or irritation To the red virginie-area       !! - Potential duplicate medications found. Please discuss with provider.      STOP taking these medications       acetaminophen (TYLENOL) 650 MG suppository Comments:   Reason for Stopping:         gabapentin (NEURONTIN) 100 MG capsule Comments:   Reason for Stopping:         hyoscyamine 0.125 MG TBDP Comments:   Reason for Stopping:         loperamide (IMODIUM A-D) 2 MG tablet Comments:   Reason for Stopping:         loperamide (IMODIUM A-D) 2 MG tablet Comments:   Reason for Stopping:         morphine 5 MG solu-tab Comments:   Reason for Stopping:         QUEtiapine (SEROQUEL) 100 MG tablet Comments:   Reason for Stopping:         QUEtiapine (SEROQUEL) 200 MG tablet Comments:   Reason for Stopping:         QUEtiapine (SEROQUEL) 200 MG tablet Comments:   Reason for Stopping:             Allergies   Allergies   Allergen Reactions     Metoprolol Succinate Other (See Comments)     Bad dreams,restlessness.     Septra [Sulfa Drugs] GI Disturbance     Doxycycline GI Disturbance     GI upset     Data   Most Recent 3 CBC's:  Recent Labs   Lab Test 02/08/19  0729 02/04/19  0701 02/03/19  0045   WBC 5.6 3.8* 5.5   HGB 12.0* 11.4* 11.5*   MCV 94 95 95    135* 145*      Most Recent 3 BMP's:  Recent Labs   Lab Test  02/08/19  0729 02/05/19  0716 02/04/19  0701   * 144 140   POTASSIUM 3.7 3.8 3.6   CHLORIDE 115* 111* 108   CO2 27 29 29   BUN 15 12 12   CR 0.60* 0.56* 0.54*   ANIONGAP 6 4 3   PARTH 8.7 8.9 8.3*   GLC 93 80 86     Most Recent 2 LFT's:  Recent Labs   Lab Test 02/03/19  0045 02/11/18  0235   AST 29 23   ALT 19 21   ALKPHOS 59 58   BILITOTAL 0.7 1.2     Most Recent INR's and Anticoagulation Dosing History:  Anticoagulation Dose History     Recent Dosing and Labs Latest Ref Rng & Units 3/20/2009 2/23/2011 8/22/2011 12/17/2012 9/18/2013    INR 0.86 - 1.14 1.09 1.12 1.05 1.07 1.06        Most Recent 3 Troponin's:  Recent Labs   Lab Test 03/14/14  1534 09/18/13  1822 09/18/13  1221   TROPI <0.012 0.782* <0.012     Most Recent Cholesterol Panel:  Recent Labs   Lab Test 02/05/13  1100   CHOL 109   LDL 53   HDL 44   TRIG 62     Most Recent 6 Bacteria Isolates From Any Culture (See EPIC Reports for Culture Details):  Recent Labs   Lab Test 02/03/19  0055 02/13/18  0917 02/11/18  0258 02/11/18  0251   CULT >100,000 colonies/mL  Escherichia coli  * Light growth  Normal adolfo    Moderate growth  Candida albicans / dubliniensis  Candida albicans and Candida dubliniensis are not routinely speciated  Susceptibility testing not routinely done  * No growth No growth     Most Recent TSH, T4 and A1c Labs:  Recent Labs   Lab Test 03/06/12  0930   TSH 1.41

## 2019-02-06 NOTE — PROGRESS NOTES
D: SUZETTE following for discharge planning.   I: SUZETTE spoke with Courtney RN at [facility] in [place], [phone], fax [phone]. Updated her on pt. Per Courtney, pt does swear at staff and can be combative at baseline. Did inform her pt has a sitter here at the hospital. Pt is typically in a wheelchair but does attempt to get up on his own. She is aware he may be realeased Thursday to return to [facility]. Orders should be faxed to her and their facility will fill any new medications.   P: SUZETTE will continue to follow for discharge planning.     Ruthy White MSW, LGSW  [phone]

## 2019-02-06 NOTE — PLAN OF CARE
Patient is confused, restless, and is unable to be assessed for orientation. Patient is vitally stable. Tolerating mechanically soft diet with honey thickened liquids. Patient is incontinent of bowel and bladder. Was given morphine for pain x1. Can be combative and agitated at times. Pulled out R PIV during antibiotic infusion. Sitter at bedside. Plan to discharge to memory care unit 2/6.

## 2019-02-06 NOTE — PROGRESS NOTES
[facility]    Hospitalist Progress Note    Assessment & Plan   Capo Jacques is a 88 year old male who was admitted on 2/3/2019 with encephalopathy thought related to UTI and underlying Lewy Body Dementia, and on medications for agitation and currently sedated:    Impression:   Principal Problem:    Toxic metabolic encephalopathy   -- multifactorial (UTI, lewy body dementia, medications)    Active Problems:    Essential hypertension, benign      CAD (coronary artery disease)      UTI (urinary tract infection) -- E. Coli, sensitive to Keflex   -- on Ceftriaxone, will switch to Keflex 500 mg bid      Lewy body dementia      Plan:  Will back off on sedation so that he is awake enough to eat (stop the Seroquel 200 mg bid, and try Trazodone 25 mg q4hr prn agitation).  Discussed situation with daughter -- brought up hospice, and she said he was on hospice a year ago but rallied so they took him off it.  Will consider hospice again if no improvement in next 1-2 days.     DVT Prophylaxis: Pneumatic Compression Devices  Code Status: DNR/DNI    Disposition: Expected discharge in 1-2 days once eating and agitation manageable.    Fercho Marquez MD  Pager [phone]  Cell Phone [phone]  Text Page (7am to 6pm)    Interval History   Today sleeping, not eating, but last 2 days was agitated so Seroquel dose increased.      Physical Exam   Temp: 96.3  F (35.7  C) Temp src: Axillary BP: 179/89 Pulse: 84 Heart Rate: 84 Resp: 16 SpO2: 96 % O2 Device: None (Room air)    Vitals:    02/03/19 0449   Weight: 73.4 kg (161 lb 12.8 oz)     Vital Signs with Ranges  Temp:  [96.3  F (35.7  C)-96.5  F (35.8  C)] 96.3  F (35.7  C)  Pulse:  [81-84] 84  Heart Rate:  [65-84] 84  Resp:  [16-18] 16  BP: (114-179)/(70-89) 179/89  SpO2:  [95 %-97 %] 96 %  No intake/output data recorded.    # Pain Assessment:  Current Pain Score 2/5/2019   Patient currently in pain? sleeping: patient not able to self report   Pain  sergei -   Capo s pain level was assessed and he currently denies pain.        Constitutional: Sleeping, has not eaten today  Respiratory: Clear to auscultation bilaterally, no crackles or wheezing  Cardiovascular: Regular rate and rhythm, normal S1 and S2, and no murmur noted  GI: Normal bowel sounds, soft, non-distended, non-tender  Extrem: No calf tenderness, no ankle edema  Neuro: sleeping, responds to pain    Medications       acetaminophen  650 mg Oral BID     cephALEXin  500 mg Oral 2 times daily     melatonin  3 mg Oral At Bedtime     polyethylene glycol  17 g Oral Daily     potassium chloride  40 mEq Oral Daily     traZODone  200 mg Oral At Bedtime       Data   Recent Labs   Lab 02/05/19  0716 02/04/19  0701 02/03/19  0045   WBC  --  3.8* 5.5   HGB  --  11.4* 11.5*   MCV  --  95 95   PLT  --  135* 145*    140 142   POTASSIUM 3.8 3.6 3.7   CHLORIDE 111* 108 108   CO2 29 29 29   BUN 12 12 12   CR 0.56* 0.54* 0.61*   ANIONGAP 4 3 5   PARTH 8.9 8.3* 8.3*   GLC 80 86 87   ALBUMIN  --   --  2.9*   PROTTOTAL  --   --  6.3*   BILITOTAL  --   --  0.7   ALKPHOS  --   --  59   ALT  --   --  19   AST  --   --  29       Imaging:   No results found for this or any previous visit (from the past 24 hour(s)).

## 2019-02-06 NOTE — PLAN OF CARE
ТАТЬЯНА pt orientation-non communicative ex for swearing when agitated- combative w/ staff, but weak. Mech soft diet-fair appetite w/ honey thick liquids. Pt had stool incontinence and was combative when trying to change-Seroquel PRN given w/ no relief, Zyprexa given w/ pt being calmed. Sitter at bedside as able. Pt Plan: Discharge back to TCU as pt mentation is not changed and pt is clinically stable.

## 2019-02-07 PROCEDURE — A9270 NON-COVERED ITEM OR SERVICE: HCPCS | Mod: GY | Performed by: INTERNAL MEDICINE

## 2019-02-07 PROCEDURE — 25000132 ZZH RX MED GY IP 250 OP 250 PS 637: Mod: GY | Performed by: INTERNAL MEDICINE

## 2019-02-07 PROCEDURE — 12000000 ZZH R&B MED SURG/OB

## 2019-02-07 PROCEDURE — 99233 SBSQ HOSP IP/OBS HIGH 50: CPT | Performed by: INTERNAL MEDICINE

## 2019-02-07 RX ADMIN — Medication 25 MG: at 11:26

## 2019-02-07 RX ADMIN — TRAZODONE HYDROCHLORIDE 200 MG: 100 TABLET ORAL at 20:20

## 2019-02-07 RX ADMIN — ACETAMINOPHEN 650 MG: 325 TABLET, FILM COATED ORAL at 06:06

## 2019-02-07 RX ADMIN — CEPHALEXIN 500 MG: 250 CAPSULE ORAL at 06:05

## 2019-02-07 RX ADMIN — MELATONIN TAB 3 MG 3 MG: 3 TAB at 20:20

## 2019-02-07 RX ADMIN — ACETAMINOPHEN 650 MG: 325 TABLET, FILM COATED ORAL at 08:12

## 2019-02-07 RX ADMIN — ACETAMINOPHEN 650 MG: 325 TABLET, FILM COATED ORAL at 20:19

## 2019-02-07 RX ADMIN — POTASSIUM CHLORIDE 40 MEQ: 1.5 POWDER, FOR SOLUTION ORAL at 08:13

## 2019-02-07 RX ADMIN — POLYETHYLENE GLYCOL 3350 17 G: 17 POWDER, FOR SOLUTION ORAL at 08:11

## 2019-02-07 RX ADMIN — Medication 25 MG: at 17:05

## 2019-02-07 RX ADMIN — Medication 25 MG: at 03:48

## 2019-02-07 RX ADMIN — CEPHALEXIN 500 MG: 250 CAPSULE ORAL at 18:20

## 2019-02-07 ASSESSMENT — ACTIVITIES OF DAILY LIVING (ADL)
RETIRED_EATING: 2-->ASSISTIVE PERSON
COGNITION: 2 - DIFFICULTY WITH ORGANIZING THOUGHTS
SWALLOWING: 2-->DIFFICULTY SWALLOWING LIQUIDS/FOODS
ADLS_ACUITY_SCORE: 21
ADLS_ACUITY_SCORE: 35
FALL_HISTORY_WITHIN_LAST_SIX_MONTHS: YES
TOILETING: 3-->ASSISTIVE EQUIPMENT AND PERSON
TRANSFERRING: 3-->ASSISTIVE EQUIPMENT AND PERSON
ADLS_ACUITY_SCORE: 21
ADLS_ACUITY_SCORE: 19.5
RETIRED_COMMUNICATION: 2-->DIFFICULTY UNDERSTANDING (NOT RELATED TO LANGUAGE BARRIER)
ADLS_ACUITY_SCORE: 35
AMBULATION: 3-->ASSISTIVE EQUIPMENT AND PERSON
BATHING: 3-->ASSISTIVE EQUIPMENT AND PERSON
DRESS: 3-->ASSISTIVE EQUIPMENT AND PERSON
ADLS_ACUITY_SCORE: 21

## 2019-02-07 NOTE — PLAN OF CARE
"ТАТЬЯНА orientation. Patient does not talk; mumbles and has rambling speech. Was restless/fidgety @ times on shift; PRN PO Trazodone given. VSS. Patient showing pain of a \"3\" using FLACC scale; PRN Tylenol given. Bruises and scabs t/o body. L laceration on head from recent fall.  Incontinent of B&B. Had a medium soft BM on shift. PIV SL. T&R [id number]. Arms a bit contracted. Sitter at bedside t/o night. Is W/C bound baseline. Continue to monitor.   "

## 2019-02-07 NOTE — PROGRESS NOTES
SUZETTE Note:    D/I:  SUZETTE spoke with Courtney at [facility] ([phone]) who was calling to see when patient would be ready for discharge.  Courtney stated that they do not have nurses on the weekend and would not be able to take patient back until Monday if patient is not ready for discharge tomorrow.  Requested that orders be faxed to [phone] and that they have hard scripts for any narcotics.    P: SW will continue to follow for discharge.    Alirio Gaona, MSW, LGSW

## 2019-02-07 NOTE — PLAN OF CARE
Patient is restless and is unable to properly assess orientation. VSS ex hypertensive. No signs or symptoms of pain. Patient is turned and repositioned as tolerated. Patient is incontinent of bowel and bladder. Plan to discharge to assisted living facility 2/7.

## 2019-02-08 VITALS
HEART RATE: 84 BPM | SYSTOLIC BLOOD PRESSURE: 146 MMHG | BODY MASS INDEX: 22.4 KG/M2 | OXYGEN SATURATION: 96 % | RESPIRATION RATE: 16 BRPM | WEIGHT: 161.8 LBS | TEMPERATURE: 96.9 F | DIASTOLIC BLOOD PRESSURE: 64 MMHG

## 2019-02-08 LAB
ANION GAP SERPL CALCULATED.3IONS-SCNC: 6 MMOL/L (ref 3–14)
BUN SERPL-MCNC: 15 MG/DL (ref 7–30)
CALCIUM SERPL-MCNC: 8.7 MG/DL (ref 8.5–10.1)
CHLORIDE SERPL-SCNC: 115 MMOL/L (ref 94–109)
CO2 SERPL-SCNC: 27 MMOL/L (ref 20–32)
CREAT SERPL-MCNC: 0.6 MG/DL (ref 0.66–1.25)
ERYTHROCYTE [DISTWIDTH] IN BLOOD BY AUTOMATED COUNT: 13 % (ref 10–15)
GFR SERPL CREATININE-BSD FRML MDRD: 89 ML/MIN/{1.73_M2}
GLUCOSE SERPL-MCNC: 93 MG/DL (ref 70–99)
HCT VFR BLD AUTO: 35.7 % (ref 40–53)
HGB BLD-MCNC: 12 G/DL (ref 13.3–17.7)
MCH RBC QN AUTO: 31.6 PG (ref 26.5–33)
MCHC RBC AUTO-ENTMCNC: 33.6 G/DL (ref 31.5–36.5)
MCV RBC AUTO: 94 FL (ref 78–100)
PLATELET # BLD AUTO: 186 10E9/L (ref 150–450)
POTASSIUM SERPL-SCNC: 3.7 MMOL/L (ref 3.4–5.3)
RBC # BLD AUTO: 3.8 10E12/L (ref 4.4–5.9)
SODIUM SERPL-SCNC: 148 MMOL/L (ref 133–144)
WBC # BLD AUTO: 5.6 10E9/L (ref 4–11)

## 2019-02-08 PROCEDURE — 85027 COMPLETE CBC AUTOMATED: CPT | Performed by: INTERNAL MEDICINE

## 2019-02-08 PROCEDURE — 25000132 ZZH RX MED GY IP 250 OP 250 PS 637: Mod: GY | Performed by: INTERNAL MEDICINE

## 2019-02-08 PROCEDURE — A9270 NON-COVERED ITEM OR SERVICE: HCPCS | Mod: GY | Performed by: INTERNAL MEDICINE

## 2019-02-08 PROCEDURE — 80048 BASIC METABOLIC PNL TOTAL CA: CPT | Performed by: INTERNAL MEDICINE

## 2019-02-08 PROCEDURE — 36415 COLL VENOUS BLD VENIPUNCTURE: CPT | Performed by: INTERNAL MEDICINE

## 2019-02-08 PROCEDURE — 99239 HOSP IP/OBS DSCHRG MGMT >30: CPT | Performed by: INTERNAL MEDICINE

## 2019-02-08 RX ORDER — TRAZODONE HYDROCHLORIDE 100 MG/1
200 TABLET ORAL AT BEDTIME
Qty: 60 TABLET | Refills: 0 | Status: SHIPPED | OUTPATIENT
Start: 2019-02-08 | End: 2019-03-10

## 2019-02-08 RX ORDER — TRAZODONE HYDROCHLORIDE 50 MG/1
25 TABLET, FILM COATED ORAL EVERY 4 HOURS PRN
Start: 2019-02-08 | End: 2019-03-10

## 2019-02-08 RX ORDER — LOPERAMIDE HCL 2 MG
2 CAPSULE ORAL 4 TIMES DAILY PRN
Qty: 10 CAPSULE | Refills: 0 | Status: SHIPPED | OUTPATIENT
Start: 2019-02-08 | End: 2019-03-10

## 2019-02-08 RX ORDER — POTASSIUM CHLORIDE 1.5 G/1.58G
40 POWDER, FOR SOLUTION ORAL DAILY
Qty: 60 PACKET | Refills: 0 | Status: SHIPPED | OUTPATIENT
Start: 2019-02-08 | End: 2019-03-10

## 2019-02-08 RX ORDER — ACETAMINOPHEN 325 MG/1
650 TABLET ORAL EVERY 4 HOURS PRN
Start: 2019-02-08 | End: 2019-03-10

## 2019-02-08 RX ORDER — CEPHALEXIN 500 MG/1
500 CAPSULE ORAL 2 TIMES DAILY
Qty: 10 CAPSULE | Refills: 0
Start: 2019-02-08 | End: 2019-02-13

## 2019-02-08 RX ADMIN — ACETAMINOPHEN 650 MG: 325 TABLET, FILM COATED ORAL at 09:15

## 2019-02-08 RX ADMIN — Medication 25 MG: at 17:04

## 2019-02-08 RX ADMIN — CEPHALEXIN 500 MG: 250 CAPSULE ORAL at 06:46

## 2019-02-08 RX ADMIN — POTASSIUM CHLORIDE 40 MEQ: 1.5 POWDER, FOR SOLUTION ORAL at 09:14

## 2019-02-08 ASSESSMENT — ACTIVITIES OF DAILY LIVING (ADL)
ADLS_ACUITY_SCORE: 34
ADLS_ACUITY_SCORE: 19.5
ADLS_ACUITY_SCORE: 34

## 2019-02-08 NOTE — PROGRESS NOTES
SW: SW scheduled transport via stretcher with [facility] at 1730. PCS form completed, faxed to [facility] and hard copy placed on patient chart.  SW notified medical team and called wife to notify of transport time and answered her questions about patient status. Wife plans to call patient's nurse for an update.    SW has identified no other social service needs at this time. SW will remain available should other needs arise.

## 2019-02-08 NOTE — PLAN OF CARE
ТАТЬЯНА orientation. Word salad. VSS. Mechanical soft + honey thick liquids. Crushed pills w/ apple sauce. Incontinent b/b. Slept well through night, no attendant needed. Staples to scalp - intact, remove outpatient. L PIV SL. Cr 0.56. Possible discharge back to group home 2/8.

## 2019-02-08 NOTE — PROGRESS NOTES
[facility]    Hospitalist Progress Note    Assessment & Plan   Capo Jacques is a 88 year old male who was admitted on 2/3/2019 with encephalopathy thought related to UTI and underlying Lewy Body Dementia, and on medications for agitation and currently more alert and able to eat some today:    Impression:   Principal Problem:    Toxic metabolic encephalopathy   -- multifactorial (UTI, lewy body dementia, medications)    Active Problems:    Essential hypertension, benign      CAD (coronary artery disease)      UTI (urinary tract infection) -- E. Coli, sensitive to Keflex   -- Was on Ceftriaxone, now switched to Keflex 500 mg bid      Lewy body dementia   -- Trazodone at bedtime, and prn agitation   -- Seroquel at bedtime if needed      Plan:  Updated wife and daughter -- they do not want hospice at present, will continue to treat UTI, try to get him to sleep at night so more awake during the day.     DVT Prophylaxis: Pneumatic Compression Devices  Code Status: DNR/DNI    Disposition: Expected discharge in 1-2 days once eating and agitation manageable.    Fercho Marquez MD  Pager [phone]  Cell Phone [phone]  Text Page (7am to 6pm)    Interval History   Up most of last night, but able to eat today and answer some questions but still quite confused.     Physical Exam   Temp: 96.3  F (35.7  C) Temp src: Axillary BP: 156/78   Heart Rate: 94 Resp: 18 SpO2: 93 % O2 Device: None (Room air)    Vitals:    02/03/19 0449   Weight: 73.4 kg (161 lb 12.8 oz)     Vital Signs with Ranges  Temp:  [96.3  F (35.7  C)-97.3  F (36.3  C)] 96.3  F (35.7  C)  Heart Rate:  [69-94] 94  Resp:  [18] 18  BP: (145-156)/(48-78) 156/78  SpO2:  [92 %-94 %] 93 %  I/O last 3 completed shifts:  In: 450 [P.O.:450]  Out: -     # Pain Assessment:  Current Pain Score 2/7/2019   Patient currently in pain? -   Pain location -   rFLACC pain score 3   Capo s pain level was assessed and he currently denies pain.         Constitutional: Awake, eating some  Respiratory: Clear to auscultation bilaterally, no crackles or wheezing  Cardiovascular: Regular rate and rhythm, normal S1 and S2, and no murmur noted  GI: Normal bowel sounds, soft, non-distended, non-tender  Extrem: No calf tenderness, no ankle edema  Neuro: awake, mildly restless, will say yes and no, but not oriented.     Medications       acetaminophen  650 mg Oral BID     cephALEXin  500 mg Oral 2 times daily     melatonin  3 mg Oral At Bedtime     polyethylene glycol  17 g Oral Daily     potassium chloride  40 mEq Oral Daily     traZODone  200 mg Oral At Bedtime       Data   Recent Labs   Lab 02/05/19  0716 02/04/19  0701 02/03/19  0045   WBC  --  3.8* 5.5   HGB  --  11.4* 11.5*   MCV  --  95 95   PLT  --  135* 145*    140 142   POTASSIUM 3.8 3.6 3.7   CHLORIDE 111* 108 108   CO2 29 29 29   BUN 12 12 12   CR 0.56* 0.54* 0.61*   ANIONGAP 4 3 5   PARTH 8.9 8.3* 8.3*   GLC 80 86 87   ALBUMIN  --   --  2.9*   PROTTOTAL  --   --  6.3*   BILITOTAL  --   --  0.7   ALKPHOS  --   --  59   ALT  --   --  19   AST  --   --  29       Imaging:   No results found for this or any previous visit (from the past 24 hour(s)).

## 2019-02-08 NOTE — PLAN OF CARE
Pt more alert this shift- spoke a few sentences, mostly word salad. Mech soft- honey thick liquid diet. Appetite increased eating 25% breakfast and lunch. Takes pills crushed w/ juice or applesauce. Incontinent of B&B- no BM this shift (large BM on NOCS). Pt gets restless with incontinence or pain- Tylenol-effective. Staples to frontline frontal scalp intact w/ dried drainage. Remove as outpatient per MD. Trazodone PRN for agitatoin increased restlessness. Given x1-effective. Plan to discontinue bedside attendant so patient can discharge back to [facility] tomorrow.

## 2019-02-09 NOTE — PROGRESS NOTES
VSS. Disoriented x4. Mumbles. Lethargic at times but alert at others. Able to eat some lunch with assistance. Does not appear to be in pain. Incontinent of urine. Trazadone given prior to discharge. Discharge back to facility. Transport running late; updated facility staff; ok with it. PIV removed. Belongings sent with patient. Wife aware of discharge plan. Patient left unit at 1845 with [facility] stretcher transport to discharge back to facility.

## 2019-03-23 ENCOUNTER — TRANSFERRED RECORDS (OUTPATIENT)
Dept: HEALTH INFORMATION MANAGEMENT | Facility: CLINIC | Age: 84
End: 2019-03-23

## 2019-03-23 ENCOUNTER — APPOINTMENT (OUTPATIENT)
Dept: CT IMAGING | Facility: CLINIC | Age: 84
End: 2019-03-23
Attending: EMERGENCY MEDICINE
Payer: MEDICARE

## 2019-03-23 ENCOUNTER — HOSPITAL ENCOUNTER (EMERGENCY)
Facility: CLINIC | Age: 84
Discharge: HOME | End: 2019-03-23
Attending: EMERGENCY MEDICINE | Admitting: EMERGENCY MEDICINE
Payer: MEDICARE

## 2019-03-23 VITALS
OXYGEN SATURATION: 97 % | TEMPERATURE: 98.1 F | DIASTOLIC BLOOD PRESSURE: 111 MMHG | RESPIRATION RATE: 18 BRPM | HEART RATE: 93 BPM | SYSTOLIC BLOOD PRESSURE: 158 MMHG

## 2019-03-23 DIAGNOSIS — R45.1 AGITATION: ICD-10-CM

## 2019-03-23 DIAGNOSIS — E86.0 DEHYDRATION: ICD-10-CM

## 2019-03-23 DIAGNOSIS — R19.7 DIARRHEA, UNSPECIFIED TYPE: ICD-10-CM

## 2019-03-23 LAB
ALBUMIN SERPL-MCNC: 3.2 G/DL (ref 3.4–5)
ALBUMIN UR-MCNC: NEGATIVE MG/DL
ALP SERPL-CCNC: 48 U/L (ref 40–150)
ALT SERPL W P-5'-P-CCNC: 30 U/L (ref 0–70)
ANION GAP SERPL CALCULATED.3IONS-SCNC: 7 MMOL/L (ref 3–14)
APPEARANCE UR: CLEAR
AST SERPL W P-5'-P-CCNC: 33 U/L (ref 0–45)
BACTERIA #/AREA URNS HPF: ABNORMAL /HPF
BASOPHILS # BLD AUTO: 0 10E9/L (ref 0–0.2)
BASOPHILS NFR BLD AUTO: 0.3 %
BILIRUB SERPL-MCNC: 0.8 MG/DL (ref 0.2–1.3)
BILIRUB UR QL STRIP: NEGATIVE
BUN SERPL-MCNC: 17 MG/DL (ref 7–30)
CALCIUM SERPL-MCNC: 8.5 MG/DL (ref 8.5–10.1)
CHLORIDE SERPL-SCNC: 110 MMOL/L (ref 94–109)
CO2 SERPL-SCNC: 26 MMOL/L (ref 20–32)
COLOR UR AUTO: YELLOW
CREAT SERPL-MCNC: 0.6 MG/DL (ref 0.66–1.25)
DIFFERENTIAL METHOD BLD: ABNORMAL
EOSINOPHIL # BLD AUTO: 0 10E9/L (ref 0–0.7)
EOSINOPHIL NFR BLD AUTO: 0.8 %
ERYTHROCYTE [DISTWIDTH] IN BLOOD BY AUTOMATED COUNT: 12.4 % (ref 10–15)
GFR SERPL CREATININE-BSD FRML MDRD: 89 ML/MIN/{1.73_M2}
GLUCOSE SERPL-MCNC: 85 MG/DL (ref 70–99)
GLUCOSE UR STRIP-MCNC: NEGATIVE MG/DL
HCT VFR BLD AUTO: 41.4 % (ref 40–53)
HGB BLD-MCNC: 13.8 G/DL (ref 13.3–17.7)
HGB UR QL STRIP: NEGATIVE
IMM GRANULOCYTES # BLD: 0 10E9/L (ref 0–0.4)
IMM GRANULOCYTES NFR BLD: 0.3 %
INR PPP: 1.11 (ref 0.86–1.14)
KETONES UR STRIP-MCNC: 5 MG/DL
LACTATE BLD-SCNC: 1.5 MMOL/L (ref 0.7–2)
LEUKOCYTE ESTERASE UR QL STRIP: NEGATIVE
LIPASE SERPL-CCNC: 83 U/L (ref 73–393)
LYMPHOCYTES # BLD AUTO: 1.7 10E9/L (ref 0.8–5.3)
LYMPHOCYTES NFR BLD AUTO: 47.2 %
MCH RBC QN AUTO: 32 PG (ref 26.5–33)
MCHC RBC AUTO-ENTMCNC: 33.3 G/DL (ref 31.5–36.5)
MCV RBC AUTO: 96 FL (ref 78–100)
MONOCYTES # BLD AUTO: 0.3 10E9/L (ref 0–1.3)
MONOCYTES NFR BLD AUTO: 9.4 %
MUCOUS THREADS #/AREA URNS LPF: PRESENT /LPF
NEUTROPHILS # BLD AUTO: 1.5 10E9/L (ref 1.6–8.3)
NEUTROPHILS NFR BLD AUTO: 42 %
NITRATE UR QL: NEGATIVE
NRBC # BLD AUTO: 0 10*3/UL
NRBC BLD AUTO-RTO: 0 /100
PH UR STRIP: 5 PH (ref 5–7)
PLATELET # BLD AUTO: 144 10E9/L (ref 150–450)
POTASSIUM SERPL-SCNC: 3.6 MMOL/L (ref 3.4–5.3)
PROT SERPL-MCNC: 6.6 G/DL (ref 6.8–8.8)
RBC # BLD AUTO: 4.31 10E12/L (ref 4.4–5.9)
RBC #/AREA URNS AUTO: 1 /HPF (ref 0–2)
SODIUM SERPL-SCNC: 143 MMOL/L (ref 133–144)
SOURCE: ABNORMAL
SP GR UR STRIP: 1.02 (ref 1–1.03)
UROBILINOGEN UR STRIP-MCNC: 0 MG/DL (ref 0–2)
WBC # BLD AUTO: 3.6 10E9/L (ref 4–11)
WBC #/AREA URNS AUTO: 1 /HPF (ref 0–5)

## 2019-03-23 PROCEDURE — 96372 THER/PROPH/DIAG INJ SC/IM: CPT | Mod: 59

## 2019-03-23 PROCEDURE — 25000128 H RX IP 250 OP 636: Performed by: EMERGENCY MEDICINE

## 2019-03-23 PROCEDURE — 85025 COMPLETE CBC W/AUTO DIFF WBC: CPT | Performed by: EMERGENCY MEDICINE

## 2019-03-23 PROCEDURE — 93005 ELECTROCARDIOGRAM TRACING: CPT

## 2019-03-23 PROCEDURE — 96361 HYDRATE IV INFUSION ADD-ON: CPT

## 2019-03-23 PROCEDURE — 80053 COMPREHEN METABOLIC PANEL: CPT | Performed by: EMERGENCY MEDICINE

## 2019-03-23 PROCEDURE — 96375 TX/PRO/DX INJ NEW DRUG ADDON: CPT | Mod: 59

## 2019-03-23 PROCEDURE — 85610 PROTHROMBIN TIME: CPT | Performed by: EMERGENCY MEDICINE

## 2019-03-23 PROCEDURE — 83605 ASSAY OF LACTIC ACID: CPT | Performed by: EMERGENCY MEDICINE

## 2019-03-23 PROCEDURE — 87040 BLOOD CULTURE FOR BACTERIA: CPT | Performed by: EMERGENCY MEDICINE

## 2019-03-23 PROCEDURE — 83690 ASSAY OF LIPASE: CPT | Performed by: EMERGENCY MEDICINE

## 2019-03-23 PROCEDURE — 87086 URINE CULTURE/COLONY COUNT: CPT | Performed by: EMERGENCY MEDICINE

## 2019-03-23 PROCEDURE — 96374 THER/PROPH/DIAG INJ IV PUSH: CPT

## 2019-03-23 PROCEDURE — 81001 URINALYSIS AUTO W/SCOPE: CPT | Performed by: EMERGENCY MEDICINE

## 2019-03-23 PROCEDURE — 70450 CT HEAD/BRAIN W/O DYE: CPT

## 2019-03-23 PROCEDURE — 99285 EMERGENCY DEPT VISIT HI MDM: CPT | Mod: 25

## 2019-03-23 RX ORDER — SODIUM CHLORIDE 9 MG/ML
1000 INJECTION, SOLUTION INTRAVENOUS CONTINUOUS
Status: DISCONTINUED | OUTPATIENT
Start: 2019-03-23 | End: 2019-03-23 | Stop reason: HOSPADM

## 2019-03-23 RX ORDER — DIPHENHYDRAMINE HYDROCHLORIDE 50 MG/ML
25 INJECTION INTRAMUSCULAR; INTRAVENOUS ONCE
Status: COMPLETED | OUTPATIENT
Start: 2019-03-23 | End: 2019-03-23

## 2019-03-23 RX ORDER — LORAZEPAM 2 MG/ML
0.5 INJECTION INTRAMUSCULAR ONCE
Status: COMPLETED | OUTPATIENT
Start: 2019-03-23 | End: 2019-03-23

## 2019-03-23 RX ORDER — ZIPRASIDONE MESYLATE 20 MG/ML
10 INJECTION, POWDER, LYOPHILIZED, FOR SOLUTION INTRAMUSCULAR ONCE
Status: COMPLETED | OUTPATIENT
Start: 2019-03-23 | End: 2019-03-23

## 2019-03-23 RX ORDER — LORAZEPAM 2 MG/ML
0.25 INJECTION INTRAMUSCULAR ONCE
Status: COMPLETED | OUTPATIENT
Start: 2019-03-23 | End: 2019-03-23

## 2019-03-23 RX ORDER — LIDOCAINE 40 MG/G
CREAM TOPICAL
Status: DISCONTINUED | OUTPATIENT
Start: 2019-03-23 | End: 2019-03-23 | Stop reason: HOSPADM

## 2019-03-23 RX ADMIN — ZIPRASIDONE MESYLATE 10 MG: 20 INJECTION, POWDER, LYOPHILIZED, FOR SOLUTION INTRAMUSCULAR at 08:37

## 2019-03-23 RX ADMIN — LORAZEPAM 0.5 MG: 2 INJECTION INTRAMUSCULAR; INTRAVENOUS at 09:11

## 2019-03-23 RX ADMIN — ZIPRASIDONE MESYLATE 10 MG: 20 INJECTION, POWDER, LYOPHILIZED, FOR SOLUTION INTRAMUSCULAR at 08:16

## 2019-03-23 RX ADMIN — DIPHENHYDRAMINE HYDROCHLORIDE 25 MG: 50 INJECTION, SOLUTION INTRAMUSCULAR; INTRAVENOUS at 09:34

## 2019-03-23 RX ADMIN — SODIUM CHLORIDE 1000 ML: 9 INJECTION, SOLUTION INTRAVENOUS at 08:54

## 2019-03-23 RX ADMIN — LORAZEPAM 0.25 MG: 2 INJECTION INTRAMUSCULAR; INTRAVENOUS at 08:54

## 2019-03-23 NOTE — ED AVS SNAPSHOT
[facility] Emergency Department  [address]  [facility] [address]  Phone:  [phone]  Fax:  [phone]                                    Capo Jacques   MRN: [id number]    Department:  [facility] Emergency Department   Date of Visit:  3/23/2019           After Visit Summary Signature Page    I have received my discharge instructions, and my questions have been answered. I have discussed any challenges I see with this plan with the nurse or doctor.    ..........................................................................................................................................  Patient/Patient Representative Signature      ..........................................................................................................................................  Patient Representative Print Name and Relationship to Patient    ..................................................               ................................................  Date                                   Time    ..........................................................................................................................................  Reviewed by Signature/Title    ...................................................              ..............................................  Date                                               Time          22EPIC Rev 08/18

## 2019-03-23 NOTE — ED TRIAGE NOTES
Patient arrived via EMS for increased mental agitation. Lives in Memory Care and  Has had 2 days of diarrhea and possible fever. Patient agitated and hitting while being triaged.

## 2019-03-23 NOTE — ED PROVIDER NOTES
History     Chief Complaint:   Diarrhea    HPI   The patient's HPI is limited secondary to Lewy Body Dementia.     Capo Jacques is a 88 year old male status post intracoronary stent with a history of CAD, hypertension, sepsis, and UTI who presents with diarrhea. Per EMS, the patient's memory care center reports the patient having a couple days of loose stools, UTI symptoms, and increased agitation, prompting their call for ambulance for labs. EMS states the patient had stable vitals en route. Patient vaguely denies abdominal pain, nausea, and any other bodily pain.     Per [facility] care facility, the patient has not been feeling well over the past week with reduced PO intake. The on call staff states the patient took both Senna-docusate, Maalox, and Miralax yesterday with two emesis last evening and another four episodes through the night with increased agitation from baseline.       Allergies:  Metoprolol Succinate  Septra [Sulfa Drugs]  Doxycycline    Medications:    alum & mag hydroxide-simethicone (MYLANTA/MAALOX) 200-200-20 MG/5ML SUSP suspension  aspirin (ASA) 81 MG chewable tablet  Dextromethorphan-guaiFENesin  MG/5ML syrup  magnesium hydroxide (MOM) 2400 MG/10ML SUSP  polyethylene glycol (MIRALAX/GLYCOLAX) Packet  senna-docusate (SENOKOT-S/PERICOLACE) 8.6-50 MG tablet  terazosin (HYTRIN) 5 MG capsule  traZODone (DESYREL) 100 MG tablet  traZODone (DESYREL) 50 MG tablet    Past Medical History:    Localized osteoarthrosis not specified whether primary or secondary, lower leg   Unspecified essential hypertension   Lewy body dementia   Toxic metabolic encephalopathy   UTI (urinary tract infection)   Sepsis (H)   Pure hypercholesterolemia   CAD (coronary artery disease)   AK (actinic keratosis)   Cataract     Past Surgical History:    C PLASTY KNEE,MED OR LAT COMPARTMENT, Bilateral   CATARACT IOL, Bilateral  CORONARY STENT PERCUT, INITIAL VESSEL: Intracoronary Stent  KNEE SCOPE,MED/LAT  MENISECTOMY   REMOVE TONSILS/ADENOIDS,<11 Y/O   REMOVAL OF SPERM DUCT(S)  REMV PILONIDAL LESION SIMPLE    Family History:    Mother: CVD  Brother: Heart Disease  Unknown Cancer  Throat Cancer  Diabetes    Social History:  Marital Status:     Tobacco Status: Former Smoker, Quit in 1981; Never Used Smokeless   Alcohol Use: 3-4 drinks a day  Drug Use: No  Presents: By Ambulance         Review of Systems   Unable to perform ROS: Dementia         Physical Exam   First Vitals:  BP: 102/70  Pulse: 76  Temp: 98.1  F (36.7  C)  Resp: 18  SpO2: 97 %      Physical Exam  General: The patient is alert, in no respiratory distress. Patient is speaking agitated and whistling.    HENT: Mucous membranes are dry.    Cardiovascular: Regular rate and rhythm. Good pulses in all four extremities. Normal capillary refill and skin turgor.     Respiratory: Lungs are clear. No nasal flaring. No retractions. No wheezing, no crackles.    Gastrointestinal: Abdomen soft. No guarding, no rebound. No palpable hernias.     Musculoskeletal: No gross deformity.     Skin: No rashes or petechiae.     Neurologic: The patient is alert and oriented x3. GCS 15. No testable cranial nerve deficit. Gives appropriate answers. Good strength in all extremities. No gross neurologic deficit. Gross sensation intact. Pupils are round and reactive. No meningismus. Patient will say his name is Vulcan. He moves all ext with good strength, attempting to climb out of bed and push away examiners.     Lymphatic: No cervical adenopathy. No lower extremity swelling.    Psychiatric: The patient is non-tearful.    Emergency Department Course   ECG:  ECG (08:39:21):  Rate 94 bpm. CA interval 150. QRS duration 92. QT/QTc 382/477. P-R-T axes 79  -7  64.  Interpretation: Sinus Rhythm with Premature Supraventricular Complexes; Septal Infarct, Age Undetermined (Old). Interpreted at 0851 by Yonathan Xiong MD on 03/23/2019.    Imaging:  Radiographic findings were  communicated with the patient and family who voiced understanding of the findings.  CT-scan Head w/o contrast:  IMPRESSION: Motion degraded images without definite acute intracranial  pathology.  As read by Radiology.     Laboratory:  Blood Culture: Pending x2  UA with Microscopic: Ketones 5 (A), Bacteria Few (A), Mucous Present (A) o/w Unremarkable  Urine Culture: Pending  Lactic Acid Whole Blood: 1.5 (N)  CMP: Chloride 110 (H), Creatinine 0.60 (L), Albumin 3.2 (L), Protein Total 6.6 o/w WNL  CBC: WBC 3.6 (L), RBC 4.31 (L),  (L), Absolute Neutrophil 1.5 (L) o/w WNL (HGB 13.8)  INR: 1.11 (N)   Lipase: 83 (N)    Interventions:  0816: GEODON 10 mg IM Injection  0837: GEODON 10 mg IM Injection  0854: 0.9% Sodium Chloride Bolus 1000 ml IV  0854: Ativan 0.25 mg IV Injection  0911: Ativan 0.25 mg IV Injection  0934: Benadryl 25 mg IV Injection    Emergency Department Course:  The patient arrived in the emergency department via EMS.  Past medical records, nursing notes, and vitals reviewed.  0801: I performed an exam of the patient and obtained history, as documented above.   IV inserted, urine and stool collected, and blood drawn. The patient was placed on continuous cardiac monitoring and pulse oximetry.  The patient was sent for a Head CT-Scan without Contrast while in the emergency department, findings above.  0820: I discussed the case with there patient's memory care facility, [facility], regarding the patient.  0824: I discussed the case with the patient's daughter regarding the patient. She consents to everything except for Head CT imaging.   0841: I discussed the case with spouse regarding the patient.  1049: I discussed the case with Dr. Oneill of Radiology regarding the patient.  1108: I discussed the case with spouse regarding the patient. I updated her regarding results.   1113: I rechecked the patient. Findings and plan explained to the Patient. Patient discharged home with instructions  regarding supportive care, medications, and reasons to return. The importance of close follow-up was reviewed.     Impression & Plan      Medical Decision Making:  I did discuss the case with the patient's family, wife, as well as the facility where he was at. It sounds like there has been some mild changes in his behavior but it sounds like he has been just a little more agitated. There has been no kristine behavior changes. There is some concern there may be recurrence of previous UTI that he had. They were unable to get that sample at the facility. He had multiple episodes of vomiting and therefore he was brought to the ER. Here, the patient was agitated which makes sense as he has ongoing dementia and behavioral problems. I did have to give him sedatives to obtain the CT as well as labs. There was no signs of UTI. His Head CT was limited but no signs of bleed. I think likely his diarrhea is secondary to the multiple medications, including Miralax, Senna, and milk of Magnesia he has been getting. The patient was hydrated here and was discharged back to his facility. I discussed the case with his family.       Diagnosis:    ICD-10-CM    1. Diarrhea, unspecified type R19.7    2. Dehydration E86.0    3. Agitation R45.1        Disposition:  discharged to home    Discharge Medications:     Medication List      None Listed          Casey Polo  3/23/2019   [facility] EMERGENCY DEPARTMENT  I, Casey Polo, am serving as a scribe at 8:01 AM on 3/23/2019 to document services personally performed by Yonathan Xiong MD based on my observations and the provider's statements to me.         Yonathan Xiong MD  03/23/19 1217

## 2019-03-23 NOTE — ED NOTES
Bed: ED09  Expected date: 3/23/19  Expected time: 7:53 AM  Means of arrival: Ambulance  Comments:  HE  87yo diarrhea

## 2019-03-24 LAB
BACTERIA SPEC CULT: NO GROWTH
Lab: NORMAL
SPECIMEN SOURCE: NORMAL

## 2019-03-25 LAB — INTERPRETATION ECG - MUSE: NORMAL

## 2019-03-26 ENCOUNTER — DOCUMENTATION ONLY (OUTPATIENT)
Dept: OTHER | Facility: CLINIC | Age: 84
End: 2019-03-26

## 2019-03-27 LAB — INTERPRETATION ECG - MUSE: NORMAL

## 2019-03-29 LAB
BACTERIA SPEC CULT: NO GROWTH
BACTERIA SPEC CULT: NO GROWTH
Lab: NORMAL
Lab: NORMAL
SPECIMEN SOURCE: NORMAL
SPECIMEN SOURCE: NORMAL

## 2025-01-15 NOTE — PLAN OF CARE
More lethargic anf flat today.  Arouses and eating when fed.  Liquid po intake down.  Incontinent x1  of stool and urine.  
Confused. Lift/total cares. No signs of pain. Attempted to feed but pt holding food in mouth and unable to swallow. NS at 100 ml/hr. VPM in place. O2 2L. Incontinent of B&B.   
Continue with total care and safety measures remain with VPM.  Potassium replaced and new IVF started.  Incontinent urine and stool.  One large soft brown stool   
Oriented to . Up with lift. DD1, nectar thick liquid, feeder, pills crushed in applesauce. Midline in place. VPM at bedside. Incontinent of bowel and bladder.   K 3.0- replaced via IV, two bags left to infuse.     
Problem: Memory Impairment Comorbidity  Goal: Memory Impairment Comorbidity  Patient comorbidity will be monitored for signs and symptoms of Memory Impairment condition.  Problems will be absent, minimized or managed by discharge/transition of care.  Outcome: No Change  Confused disoriented x4.  Garbled speech.  Reoriented      
Problem: Patient Care Overview  Goal: Plan of Care/Patient Progress Review  Discharge Planner SLP   Patient plan for discharge: None stated  Current status: Patient was seen for clinical swallow evaluation per MD orders. Patient presents with moderate-severe dysphagia. Given patient's oropharyngeal impairments and confusion, he is at risk for silent aspiration, even on a modified diet. RECOMMENDATIONS: Initiate NDD-I diet with nectar-thick liquids with 1:1 supervision for feeding. Feeder will need to watch for unsafe behaviors (biting the spoon, opening mouth too widely to drink from cup), give small/bites, give one bite/sip at a time and ensure first bite/sip has been swallowed before giving the next, and alternating liquids/solids. Patient must be sitting upright and be alert for all PO. Give meds crushed in applesauce. Speech service will continue to follow during admission to ensure diet tolerance. Suspect patient is close to his baseline diet/swallow function. Anticipate resumption of modified diet at discharge without indication for OP speech services.  Barriers to return to prior living situation: Dysphagia  Recommendations for discharge: Modified diet  Rationale for recommendations: To maximize swallow function and safety       Entered by: Renate Leo 02/11/2018 10:33 AM      
Problem: Patient Care Overview  Goal: Plan of Care/Patient Progress Review  Discharge Planner SLP   Patient plan for discharge: Not stated  Current status: Patient seen for swallowing treatment, exhibiting swallow response with delayed cough after oral cares and ice chip trials.  RN requested NPO status after observing patient holding oral bolus without attempt to swallow this morning, PO needing to be removed.  Agree with NPO status at this time, recommending oral cares with mouthwash swabs as accepted and then 4-5 small ice chips for comfort, moisture.   Barriers to return to prior living situation: NPO status, weakness and increased confusion  Recommendations for discharge: TCU  Rationale for recommendations: Pending progress for return to baseline diet of dysphagia diet level 1 or 2 with nectar thick liquids, patient may be able to return to group home if 1:1 feeding assistance is available for meals and meds. Otherwise, would likely benefit from SLP follow up at TCU.        Entered by: Laura Covarrubias 02/12/2018 3:02 PM         
Problem: Patient Care Overview  Goal: Plan of Care/Patient Progress Review  Discharge Planner SLP   Patient plan for discharge: none stated  Current status: Recommend continue dysphagia diet 1 and nectar thick liquids with 1:1 supervision. Pt should be fully upright for all PO, take small single sips/bites, slow pacing, and caregivers to verify each swallow response. Hold PO should pt demonstrate overt s/sx of aspiration.  Barriers to return to prior living situation: dysphagia; currently requires 1:1 supervision to enforce safe swallow strategies d/t cognitive status   Recommendations for discharge: continue modified diet upon d/c home  Rationale for recommendations: pt likely at ongoing risk for aspiration d/t cognition. Would benefit from ongoing diet modifications and supervision to minimize aspiration risk       Entered by: Erica Jessica 02/16/2018 10:39 AM         
Problem: Patient Care Overview  Goal: Plan of Care/Patient Progress Review  Discharge Planner SLP   Patient plan for discharge: none stated  Current status: Thin liquids via spoon resulted in overt s/sx of aspiration marked by coughing. Pt tolerated small amounts of nectar thick liquids and pureed textures with no overt s/sx of aspiration. Recommend pt continue dysphagia diet 1 and downgrade to nectar thick liquids with 1:1 supervision. Pt should be fully upright for all PO, take small single sips/bites, PO via spoon, and slow pacing. Hold PO should pt demonstrate overt s/sx of aspiration or if pt is unable to sit fully upright.   Barriers to return to prior living situation: dysphagia; unwillingness to take PO   Recommendations for discharge: ongoing ST upon d/c pending goals of care  Rationale for recommendations: suspect pt is not yet at baseline diet level; ongoing dysphagia        Entered by: Erica Jessica 02/13/2018 2:29 PM         
Problem: Patient Care Overview  Goal: Plan of Care/Patient Progress Review  Nonverbal. Agitated and restless at times, PRN haldol given. NPO. NS@ 100 ml/hr. T max 101.7, Md paged and IV tylenol ordered. Pt in fetal position and knees to chest at times, per wife this has been happening for the last 10 days. Staff assisted with repositioning.       
Problem: Patient Care Overview  Goal: Plan of Care/Patient Progress Review  Outcome: No Change    Dx: PNA  Hx: HTN, Arthritis, CAD, and A-Fib  Labs: K+ replaced on Eves, recheck 3.4.  Tele: NA  Assessment: VSS. Disoriented x3. LS: Diminished. Denies SOB/CP/Pain w/ no facial grimacing present. Midline RUE infusing LR @ 100 mL/hr. VPM throughout shift. Assist of 2 w/ lift.   Plan: Continue POC. D/C home after further evaluation/treatment.         
Problem: Patient Care Overview  Goal: Plan of Care/Patient Progress Review  Outcome: No Change  Patient up with assist two with left. VPM in room. Patient calm right now.      
Problem: Patient Care Overview  Goal: Plan of Care/Patient Progress Review  Outcome: No Change  Pt disoriented times 4. Mostly non-verbal. Pt is restless in bed. Pulls his legs towards his chest.   Pt received a midline today in rt arm. Running well. Haldol given prn at 2pm.   Pt is on DD1 pureed diet with supervision. Has poor appetite.  One episode of urine incontinence. After that bladder scan was 149.        
Problem: Patient Care Overview  Goal: Plan of Care/Patient Progress Review  Outcome: No Change  Pt disorientedx4, lethargic. VSS ex febrile - ice packs placed on armpits and groin. LS coarse, BS audible. Up A2 with lift, DD1 with nectar thick liquid diet. Pt was agitated at beginning of shift, slept after meal. Pt refused PM medications. IVF running at 100mL/hr. Cont IV rocephin. Non tele.        
Problem: Patient Care Overview  Goal: Plan of Care/Patient Progress Review  Outcome: No Change  Pt restless in bed unable to sleep, prn haldol given and pt stated pain when asked, could not rate, prn tylenol given and md paged for prn dilaudid.  Pt was able to rest and sleep after prn dilaudid.  incont of urine, no stools since admission.  NPO maintained due to pt not swallowing food, fluids NS at 100.        
Problem: Patient Care Overview  Goal: Plan of Care/Patient Progress Review  Outcome: No Change  Pt slept overnight, melatonin and trazadone given crushed in pudding, haldol given at 0245 due to restlessness.  IV fluids LR at 100, incont of bladder and changed/turned q2.  AM lab changed from pcu collect to lab collect as midline picc would not return after flush.      
Problem: Patient Care Overview  Goal: Plan of Care/Patient Progress Review  Outcome: No Change  Pt. lethargic but restless at times, occasionally will say a word but otherwise has been speaking. IVF infusing without difficulty, O2 at 2L per mask overnight tonight due to sat s dropping down to 78% on NC. O2 sat s at 94% on oxymask. Restless during the night and given Ativan 0.5mg PO, with decrease in restlessness and agitation. Lung sounds diminished, coarse, congested type cough present. Pt. continues on Rocephin for PNA. Pt. denies any needs at this time. Will continue with POC.      
Problem: Patient Care Overview  Goal: Plan of Care/Patient Progress Review  Outcome: No Change  VSS, 95% on RA, No tele, pt agitated and restless, Ativan given see MAR. Dysphagia diet nectar thick liquids. IVF @100mL/hr. Up with lift.  Incontinent. Care facility reports pt is agitated at baseline, typically has a good appetite, and ROM on his legs before bed.       
Problem: Patient Care Overview  Goal: Plan of Care/Patient Progress Review  Outcome: No Change  Vitals: VSS, afebrile.   Oxygen: RA  LOC: confused, oriented to self only, VPM in room  Pain: denies pain  Ambulation: up with lift  Nontele  Cardiac:WNL  Lungs: WNL, denies SOB  GI: incontinent  : incontinent  Skin:intact  Plan:D/C back to group home today            
Problem: Patient Care Overview  Goal: Plan of Care/Patient Progress Review  Pt slept thtough the night. No change in VS. Repositions self at time. VPM in use.      
Problem: Patient Care Overview  Goal: Plan of Care/Patient Progress Review  Pt up in chair for dinner, ate 50%. Pt made several attempts to get out of chair, pt unable to redirect and brought back to bed. While in bed pt attempted several times to get out of bed and grabbing at staff and objects, PRN haldol given which helped calm him down. Pt is currently sleeping in bed with VPM in place. Incont of urine.    Problem: Memory Impairment Comorbidity  Goal: Memory Impairment Comorbidity  Patient comorbidity will be monitored for signs and symptoms of Memory Impairment condition.  Problems will be absent, minimized or managed by discharge/transition of care.   Outcome: No Change  Pt alert to self.       
Problem: Patient Care Overview  Goal: Plan of Care/Patient Progress Review  SLP: Attempted to see pt for dysphagia tx, however pt lethargic and did not open eyes despite verbal stimulus. Per RN, pt refusing most PO and required max encouragement for PO medications this AM. MD advance to dysphagia diet 1 and thin liquids. Would recommend holding PO until pts MEL improves. Will follow up as appropriate.       
Problem: Patient Care Overview  Goal: Plan of Care/Patient Progress Review  SLP: Attempted to see pt for dysphagia tx, however pt sleeping and did not open eyes despite maximum encouragement. Nursing staff reports improved appetite this AM. Will follow up as appropriate.       
Problem: Patient Care Overview  Goal: Plan of Care/Patient Progress Review  SLP: Per discussion with RN, pt refusing all PO this morning. Attempted PO trials, however pt declined all attempts despite maximum encouragement. Will follow up as appropriate.       
Problem: Pneumonia (Adult)  Goal: Signs and Symptoms of Listed Potential Problems Will be Absent, Minimized or Managed (Pneumonia)  Signs and symptoms of listed potential problems will be absent, minimized or managed by discharge/transition of care (reference Pneumonia (Adult) CPG).   Outcome: Improving  Confused, with slt improvement today.  Appetite fair, total feed.  Lift to transfer. Incontinent of B/B.  HR tachy.  K+ 3.0, replaced.  Per family pt had the flu vac.    Problem: Memory Impairment Comorbidity  Goal: Memory Impairment Comorbidity  Patient comorbidity will be monitored for signs and symptoms of Memory Impairment condition.  Problems will be absent, minimized or managed by discharge/transition of care.   Outcome: Improving  Slt improvement in cognition today, able to state name.      
Problem: Pneumonia (Adult)  Goal: Signs and Symptoms of Listed Potential Problems Will be Absent, Minimized or Managed (Pneumonia)  Signs and symptoms of listed potential problems will be absent, minimized or managed by discharge/transition of care (reference Pneumonia (Adult) CPG).   Outcome: No Change  Confused.  LS clear, diminished.  VSS afebrile.   Appetite poor, meds crushed in AS.   Incontinent of B/B  Restless this shift      
Problem: Pneumonia (Adult)  Goal: Signs and Symptoms of Listed Potential Problems Will be Absent, Minimized or Managed (Pneumonia)  Signs and symptoms of listed potential problems will be absent, minimized or managed by discharge/transition of care (reference Pneumonia (Adult) CPG).   Outcome: No Change  Confused. Restless this shift.  VSS afebrile.  Incontinent of B/B.  Incontinent of stool x2.    LS clear, diminished.    K+ 3.4  Wife called facility, updated.       
Problem: Pneumonia (Adult)  Goal: Signs and Symptoms of Listed Potential Problems Will be Absent, Minimized or Managed (Pneumonia)  Signs and symptoms of listed potential problems will be absent, minimized or managed by discharge/transition of care (reference Pneumonia (Adult) CPG).  Outcome: No Change  Pt. Admitted from ED, no family present. Pt. Lethargic, confused, agitated at times, resistant to repositioning, incontinent of urine, IVF infusing without difficulty. Received Rocephin and Zithromax in ED. NPO at this time, swallow eval to be done today. Lung sounds coarse, congested type cough. O2 per NC at 2L but dipped down to 89%, pt. Repositioned and  O2 applied per mask due to mouth breathing, O2 sat's up to 98% on 2L. Pt. Denies any needs at this time. Will continue with POC.       
RN Shift Summary:  Diagnosis/Presentation: Pt admitted 2/11 due to fever and altered mental status. Pt diagnosed with L lung pneumonia and sepsis.   History: severe lewy body dementia  Labs/Protocols: K protocol. K: 2.8 this AM. Given total of 80 mEq PO KCl. Recheck ordered for 1800 and tomorrow AM.   Assessment: Pt alert. Pt mostly non-verbal, will repeat nonsensical phrases. VSS on 2L O2 per NC. Midline to R arm with LR @ 100mL/hr and intermittent levaquin. PRN haldol available Q6hrs for agitation, given once this shift. Next available at 2000. VPM in place for safety. Pt Ax2 with repositions. Pt gets up with a lift. Incontinent of B&B, brief in place. Dysphagia I diet in place with nectar thick liquid, poor appetite. Pt frequently refusing to eat/drink. Did eat 25% of breakfast with strong encouragement. Total feed.    Plan: IV levaquin. IV fluids.     
dischged back to Facility via EMS.  Family copy of dischg  papers with pt belongings.  EMS given packet to give to facility  
actual